# Patient Record
Sex: FEMALE | Race: WHITE | Employment: UNEMPLOYED | URBAN - METROPOLITAN AREA
[De-identification: names, ages, dates, MRNs, and addresses within clinical notes are randomized per-mention and may not be internally consistent; named-entity substitution may affect disease eponyms.]

---

## 2017-08-07 ENCOUNTER — OFFICE VISIT (OUTPATIENT)
Dept: ORTHOPEDIC SURGERY | Age: 32
End: 2017-08-07

## 2017-08-07 VITALS
WEIGHT: 122 LBS | SYSTOLIC BLOOD PRESSURE: 123 MMHG | BODY MASS INDEX: 21.62 KG/M2 | DIASTOLIC BLOOD PRESSURE: 77 MMHG | HEIGHT: 63 IN | HEART RATE: 63 BPM

## 2017-08-07 DIAGNOSIS — M79.644 FINGER PAIN, RIGHT: Primary | ICD-10-CM

## 2017-08-07 DIAGNOSIS — S69.91XA FINGERNAIL INJURY, RIGHT, INITIAL ENCOUNTER: ICD-10-CM

## 2017-08-07 PROCEDURE — 99202 OFFICE O/P NEW SF 15 MIN: CPT | Performed by: ORTHOPAEDIC SURGERY

## 2017-08-07 PROCEDURE — 73140 X-RAY EXAM OF FINGER(S): CPT | Performed by: ORTHOPAEDIC SURGERY

## 2019-08-26 ENCOUNTER — HOSPITAL ENCOUNTER (EMERGENCY)
Age: 34
Discharge: HOME | End: 2019-08-26
Payer: COMMERCIAL

## 2019-08-26 ENCOUNTER — APPOINTMENT (OUTPATIENT)
Dept: GENERAL RADIOLOGY | Age: 34
End: 2019-08-26
Payer: COMMERCIAL

## 2019-08-26 VITALS
HEIGHT: 63 IN | WEIGHT: 122 LBS | BODY MASS INDEX: 21.62 KG/M2 | OXYGEN SATURATION: 100 % | RESPIRATION RATE: 14 BRPM | DIASTOLIC BLOOD PRESSURE: 70 MMHG | HEART RATE: 99 BPM | SYSTOLIC BLOOD PRESSURE: 101 MMHG | TEMPERATURE: 98.2 F

## 2019-08-26 DIAGNOSIS — S92.425A NONDISPLACED FRACTURE OF DISTAL PHALANX OF LEFT GREAT TOE, INITIAL ENCOUNTER FOR CLOSED FRACTURE: Primary | ICD-10-CM

## 2019-08-26 DIAGNOSIS — M79.675 GREAT TOE PAIN, LEFT: ICD-10-CM

## 2019-08-26 PROCEDURE — 73630 X-RAY EXAM OF FOOT: CPT

## 2019-08-26 PROCEDURE — 99283 EMERGENCY DEPT VISIT LOW MDM: CPT

## 2019-08-26 RX ORDER — NAPROXEN 500 MG/1
500 TABLET ORAL 2 TIMES DAILY WITH MEALS
Qty: 30 TABLET | Refills: 0 | Status: SHIPPED | OUTPATIENT
Start: 2019-08-26 | End: 2020-08-09 | Stop reason: ALTCHOICE

## 2019-08-26 RX ORDER — CETIRIZINE HYDROCHLORIDE 10 MG/1
10 TABLET ORAL DAILY
COMMUNITY
End: 2020-08-09 | Stop reason: ALTCHOICE

## 2019-08-26 ASSESSMENT — PAIN SCALES - GENERAL
PAINLEVEL_OUTOF10: 7
PAINLEVEL_OUTOF10: 5

## 2019-08-26 ASSESSMENT — PAIN DESCRIPTION - PROGRESSION: CLINICAL_PROGRESSION: GRADUALLY WORSENING

## 2019-08-26 ASSESSMENT — PAIN DESCRIPTION - DESCRIPTORS: DESCRIPTORS: ACHING;THROBBING

## 2019-08-26 ASSESSMENT — ENCOUNTER SYMPTOMS
COLOR CHANGE: 0
ABDOMINAL PAIN: 0
VOMITING: 0
SHORTNESS OF BREATH: 0
BACK PAIN: 0

## 2019-08-26 ASSESSMENT — PAIN DESCRIPTION - LOCATION: LOCATION: TOE (COMMENT WHICH ONE)

## 2019-08-26 ASSESSMENT — PAIN DESCRIPTION - FREQUENCY: FREQUENCY: CONTINUOUS

## 2019-08-26 ASSESSMENT — PAIN DESCRIPTION - ONSET: ONSET: ON-GOING

## 2019-08-26 ASSESSMENT — PAIN DESCRIPTION - ORIENTATION: ORIENTATION: LEFT

## 2019-08-26 NOTE — ED PROVIDER NOTES
**[facility]  [facility]  EMERGENCY DEPARTMENT ENCOUNTER      Pt Name: Evi Casarez  QOA:[id number]  Radhagfjaime 1985  Date of evaluation: 8/26/2019  Provider: ZHANNA Guadarrama CNP      Chief Complaint:    Chief Complaint   Patient presents with    Toe Injury     states slipped down a stair and injured great toe to left foot, states \" unable to walk\"       Nursing Notes, Past Medical Hx, Past Surgical Hx, Social Hx, Allergies, and Family Hx were all reviewed and agreed with or any disagreements were addressed in the HPI.    HPI:  (Location, Duration, Timing, Severity,Quality, Assoc Sx, Context, Modifying factors)  This is a  36 y.o. female who presents today with left great toe pain. Patient states she slipped down the stairs, fell on the steps jamming her toe, injury occurred about 2 hours prior to ED arrival.  She is complaining of mostly toe pain that is worse with ambulation on the toe. She denies any head injury, no neck or back pain. She denies any lightheadedness or dizziness but before after the fall, states it is all mechanical fall. No chest pain plan chest pain or shortness of breath. No additional injuries or complaints. Only complaining of the toe pain. She rates the pain a 7 out of 10. Ambulation and movement worsens the pain. She denies any additional injuries or complaints. Denies taking medicine for pain. No additional aggravating or relieving factors. The patient presents awake, alert and in no acute respiratory distress or toxic appearance. PastMedical/Surgical History:      Diagnosis Date    Mild depression ([address]     Edis and senior year of high school     History reviewed. No pertinent surgical history. Medications:  Previous Medications    CETIRIZINE (ZYRTEC) 10 MG TABLET    Take 10 mg by mouth daily         Review of Systems:  Review of Systems   Constitutional: Negative for chills and fever.    HENT: daily.                (Please note the MDM and HPI sections of this note were completed with a voice recognition program.  Efforts weremade to edit the dictations but occasionally words are mis-transcribed.)    Electronically signed, ZHANNA Chatterjee CNP,           ZHANNA Chatterjee CNP  08/26/19 2948

## 2020-08-09 ENCOUNTER — HOSPITAL ENCOUNTER (EMERGENCY)
Age: 35
Discharge: HOME | End: 2020-08-09
Payer: COMMERCIAL

## 2020-08-09 ENCOUNTER — APPOINTMENT (OUTPATIENT)
Dept: CT IMAGING | Age: 35
End: 2020-08-09
Payer: COMMERCIAL

## 2020-08-09 VITALS
SYSTOLIC BLOOD PRESSURE: 156 MMHG | OXYGEN SATURATION: 99 % | BODY MASS INDEX: 22.14 KG/M2 | TEMPERATURE: 98 F | WEIGHT: 125 LBS | DIASTOLIC BLOOD PRESSURE: 98 MMHG | RESPIRATION RATE: 16 BRPM | HEART RATE: 74 BPM

## 2020-08-09 PROCEDURE — 99283 EMERGENCY DEPT VISIT LOW MDM: CPT

## 2020-08-09 PROCEDURE — 6370000000 HC RX 637 (ALT 250 FOR IP): Performed by: NURSE PRACTITIONER

## 2020-08-09 PROCEDURE — 70486 CT MAXILLOFACIAL W/O DYE: CPT

## 2020-08-09 RX ORDER — HYDROCODONE BITARTRATE AND ACETAMINOPHEN 5; 325 MG/1; MG/1
1-2 TABLET ORAL EVERY 6 HOURS PRN
Qty: 20 TABLET | Refills: 0 | Status: SHIPPED | OUTPATIENT
Start: 2020-08-09 | End: 2020-08-12

## 2020-08-09 RX ORDER — HYDROCODONE BITARTRATE AND ACETAMINOPHEN 5; 325 MG/1; MG/1
1 TABLET ORAL ONCE
Status: COMPLETED | OUTPATIENT
Start: 2020-08-09 | End: 2020-08-09

## 2020-08-09 RX ADMIN — HYDROCODONE BITARTRATE AND ACETAMINOPHEN 1 TABLET: 5; 325 TABLET ORAL at 19:58

## 2020-08-09 RX ADMIN — HYDROCODONE BITARTRATE AND ACETAMINOPHEN 1 TABLET: 5; 325 TABLET ORAL at 22:26

## 2020-08-09 ASSESSMENT — PAIN SCALES - GENERAL
PAINLEVEL_OUTOF10: 10
PAINLEVEL_OUTOF10: 8
PAINLEVEL_OUTOF10: 8
PAINLEVEL_OUTOF10: 10

## 2020-08-09 ASSESSMENT — PAIN DESCRIPTION - ORIENTATION: ORIENTATION: LEFT

## 2020-08-09 ASSESSMENT — PAIN DESCRIPTION - PAIN TYPE: TYPE: ACUTE PAIN

## 2020-08-09 ASSESSMENT — PAIN DESCRIPTION - LOCATION: LOCATION: EAR;FACE

## 2020-08-09 NOTE — ED PROVIDER NOTES
Evaluated by Advanced Practice Provider    [facility]  ED    CHIEF COMPLAINT  Otalgia (pt states has been having ear pain and pain on L side of face, was at urgent care and given steroids and allergy meds with no relief, states does have some bad teeth )    HISTORY OF PRESENT ILLNESS  Georgie Virgen is a 36 y.o. female who presents to the ED with complaints of left ear and facial pain. Left ear pain and pain in the left side of her face for a month. Has been to the [facility]  a couple of times. Has not seen her PCP. Has been placed on steroids, flonase, afrin, singulair, tylenol and ibuprofen without relief. The pain is a lot of pressure on the left side. Pain mostly in the ear. Touch to the left side of the face causes her pain to get worse. Denies congestion. No fevers, has some chills and sweats. Does report left sided jaw pain and left jaw pops and clicks. No report of her grinding her teeth at night. Pain is in the top of the scalp, left forehead, by the left ear and down into the jaw and neck. The patient is currently rating their pain as 8/10 and describes it as an aching type of pain. The patient denies other complaints, modifying factors or associated symptoms. The patient arrived to the ED via private car. Nursing notes reviewed. Past Medical History:   Diagnosis Date    Mild depression [facility])     Edis and senior year of high school     History reviewed. No pertinent surgical history.   Family History   Problem Relation Age of Onset    High Blood Pressure Father     Cancer Other 48        breast    Cancer Maternal Grandmother 68        breast cancer    Heart Disease Maternal Grandmother     Diabetes Paternal Grandfather     Heart Disease Paternal Grandmother         murmur     Social History     Socioeconomic History    Marital status:      Spouse name: Not on file    Number of children: Not on file    Years of education: Not on file   Ephraim Gonzalez Highest education level: Not on file   Occupational History    Not on file   Social Needs    Financial resource strain: Not on file    Food insecurity     Worry: Not on file     Inability: Not on file    Transportation needs     Medical: Not on file     Non-medical: Not on file   Tobacco Use    Smoking status: Never Smoker    Smokeless tobacco: Never Used   Substance and Sexual Activity    Alcohol use: Yes     Comment: social drinker not weekly. Birthdays and holidays    Drug use: No    Sexual activity: Yes     Partners: Male   Lifestyle    Physical activity     Days per week: Not on file     Minutes per session: Not on file    Stress: Not on file   Relationships    Social connections     Talks on phone: Not on file     Gets together: Not on file     Attends Denominational service: Not on file     Active member of club or organization: Not on file     Attends meetings of clubs or organizations: Not on file     Relationship status: Not on file    Intimate partner violence     Fear of current or ex partner: Not on file     Emotionally abused: Not on file     Physically abused: Not on file     Forced sexual activity: Not on file   Other Topics Concern    Not on file   Social History Narrative    Not on file     No current facility-administered medications for this encounter. Current Outpatient Medications   Medication Sig Dispense Refill    sertraline (ZOLOFT) 50 MG tablet Take 50 mg by mouth daily      HYDROcodone-acetaminophen (NORCO) 5-325 MG per tablet Take 1-2 tablets by mouth every 6 hours as needed for Pain for up to 3 days.  20 tablet 0     Allergies   Allergen Reactions    Sulfa Antibiotics Rash     REVIEW OF SYSTEMS    6 systems reviewed, pertinent positives per HPI otherwise noted to be negative      PHYSICAL EXAM  BP (!) 156/98   Pulse 74   Temp 98 °F (36.7 °C) (Oral)   Resp 16   Wt 125 lb (56.7 kg)   LMP 08/04/2020   SpO2 99%   BMI 22.14 kg/m²     GENERAL APPEARANCE: Awake and WITHOUT CONTRAST  8/9/2020 9:07 pm TECHNIQUE: CT of the face was performed without the administration of intravenous contrast. Multiplanar reformatted images are provided for review. Dose modulation, iterative reconstruction, and/or weight based adjustment of the mA/kV was utilized to reduce the radiation dose to as low as reasonably achievable. COMPARISON: None HISTORY: ORDERING SYSTEM PROVIDED HISTORY: left sided facial pain TECHNOLOGIST PROVIDED HISTORY: Reason for exam:->left sided facial pain Is the patient pregnant?->No Reason for Exam: left sided facial pain Acuity: Acute Type of Exam: Initial FINDINGS: FACIAL BONES:  The maxilla, pterygoid plates and zygomatic arches are intact. The mandible is intact. The mandibular condyles are normally situated. The nasal bones and maxillary nasal processes are intact. Bilateral dental caries are noted ORBITS:  The globes appear intact. The extraocular muscles, optic nerve sheath complexes and lacrimal glands appear unremarkable. No retrobulbar hematoma or mass is seen. The orbital walls and rims are intact. SINUSES/MASTOIDS:  The paranasal sinuses and mastoid air cells are well aerated. No acute fracture is seen. SOFT TISSUES:  No appreciable facial soft tissue swelling is seen. No acute traumatic injury of the facial bones. '    ED COURSE/MDM  Patient seen and evaluated. Old records reviewed. Diagnostic testing reviewed and results discussed. I have evaluated this patient. My supervising physician was available for consultation. Julien Meadows presented to the ED today with above noted complaints. Patient is afebrile and hemodynamically stable in the ED. She is well saturated on room air. Patient's physical exam is unremarkable and does not indicate any findings such as otitis media or otitis externa. I also do not see any obvious dental abscesses. CT of the facial bones obtained and without acute findings.   No evidence of sinusitis, dental abscess or other findings to explain the patient's pain. Am questioning the possibility of something such as trigeminal neuralgia or TMJ as cause to her symptoms. I advised her for this she will need to follow-up. I did not feel popping or clicking of the left TMJ and I doubt that this is likely the cause of her pain. I am giving her referral to neurology as well as I am advising her to follow-up with her PCP for further evaluation and management of her pain. Pt was given the following medications or treatments in the ED:   Medications   HYDROcodone-acetaminophen (NORCO) 5-325 MG per tablet 1 tablet (1 tablet Oral Given 8/9/20 1958)   HYDROcodone-acetaminophen (NORCO) 5-325 MG per tablet 1 tablet (1 tablet Oral Given 8/9/20 2226)       At this point I do not feel the patient requires further work up and it is reasonable to discharge the patient. Please refer to AVS for further details regarding discharge instructions. A discussion was had with the patient regarding diagnosis, diagnostic testing results, treatment/ plan of care, and follow up. All questions were answered. Patient will return to ED for new/worsening symptoms such as fever, vomiting, difficulty opening his mouth, or swallowing. Patient will follow up as directed for further evaluation/treatment. The patient was given strict return precautions as we discussed symptoms that would necessitate return to the ED. Patient will return to ED for new/worsening symptoms. The patient verbalized their understanding and agreement with the above plan. I estimate there is LOW risk for EPIGLOTTITIS, PNEUMONIA, MENINGITIS, OR URINARY TRACT INFECTION, thus I consider the discharge disposition reasonable. Also, there is no evidence or peritonitis, sepsis, or toxicity. Kenton Luis and I have discussed the diagnosis and risks, and we agree with discharging home to follow-up with their primary doctor.  We also discussed returning to the Emergency Department immediately if new or worsening symptoms occur. We have discussed the symptoms which are most concerning (e.g., changing or worsening pain, trouble swallowing or breating, neck stiffness, fever) that necessitate immediate return. Clinical Impression    1. Pressure and pain of left side of face    2. Otalgia of left ear        Discharge Vital Signs:  Blood pressure (!) 156/98, pulse 74, temperature 98 °F (36.7 °C), temperature source Oral, resp. rate 16, weight 125 lb (56.7 kg), last menstrual period 08/04/2020, SpO2 99 %, currently breastfeeding. Patient was sent home with a prescription for below medication/s. I did [place] patient on appropriate use of these medication. New Prescriptions    HYDROCODONE-ACETAMINOPHEN (NORCO) 5-325 MG PER TABLET    Take 1-2 tablets by mouth every 6 hours as needed for Pain for up to 3 days. FOLLOW UP  [address]  [place] [facility]  [phone]    Call in 1 day  For further evaluation    Shaila Taveras MD  [address] [address] [facility]  [phone]    Call today  For further evaluation    [facility] [address] [phone]  Go to   If symptoms worsen      DISPOSITION  Patient was discharged to home in good condition. Comment: Please note this report has been produced using speech recognition software and may contain errors related to that system including errors in grammar, punctuation, and spelling, as well as words and phrases that may be inappropriate. If there are any questions or concerns please feel free to contact the dictating provider for clarification.             ZHANNA Felix - CHRISTI  08/09/20 7856

## 2020-08-10 ENCOUNTER — CARE COORDINATION (OUTPATIENT)
Dept: CARE COORDINATION | Age: 35
End: 2020-08-10

## 2020-08-10 NOTE — CARE COORDINATION
Patient contacted regarding recent visit for viral symptoms. This Marta Mendez contacted the patient by telephone to perform post discharge call. Left message on machine for a return call regarding ED visit 8/9. If no return call, will attempt to reach pt again.     Mariaelena Liu  [phone]

## 2020-08-10 NOTE — ED NOTES
Discharge instructions were reviewed with the patient and the patient verbalized understanding. Patient stable and ambulatory upon discharge to home with .             Mary Lou Broussard RN  08/09/20 0982

## 2020-08-11 NOTE — CARE COORDINATION
Patient contacted regarding recent visit for viral symptoms. This Flakita Carpenter contacted the patient by telephone to perform post discharge call. Left another message on machine for a return call regarding ED visit 8/9. Will wait for a return call.      Zacarias Pérez  [phone]

## 2021-12-31 ENCOUNTER — HOSPITAL ENCOUNTER (EMERGENCY)
Age: 36
Discharge: HOME | End: 2021-12-31
Payer: COMMERCIAL

## 2021-12-31 VITALS
WEIGHT: 124 LBS | DIASTOLIC BLOOD PRESSURE: 96 MMHG | HEIGHT: 63 IN | SYSTOLIC BLOOD PRESSURE: 130 MMHG | TEMPERATURE: 99.1 F | HEART RATE: 60 BPM | RESPIRATION RATE: 14 BRPM | BODY MASS INDEX: 21.97 KG/M2 | OXYGEN SATURATION: 98 %

## 2021-12-31 DIAGNOSIS — K04.7 DENTAL INFECTION: ICD-10-CM

## 2021-12-31 DIAGNOSIS — K08.89 PAIN, DENTAL: Primary | ICD-10-CM

## 2021-12-31 PROCEDURE — 99283 EMERGENCY DEPT VISIT LOW MDM: CPT

## 2021-12-31 PROCEDURE — 64400 NJX AA&/STRD TRIGEMINAL NRV: CPT

## 2021-12-31 RX ORDER — PENICILLIN V POTASSIUM 500 MG/1
500 TABLET ORAL 4 TIMES DAILY
Qty: 40 TABLET | Refills: 0 | Status: SHIPPED | OUTPATIENT
Start: 2021-12-31 | End: 2022-01-10

## 2021-12-31 ASSESSMENT — PAIN DESCRIPTION - LOCATION: LOCATION: JAW

## 2021-12-31 ASSESSMENT — PAIN SCALES - GENERAL: PAINLEVEL_OUTOF10: 8

## 2021-12-31 NOTE — Clinical Note
Frank Gonzalez was seen and treated in our emergency department on 12/31/2021. She may return to work on 01/03/2022. If you have any questions or concerns, please don't hesitate to call.       Milvia Neal PA-C

## 2021-12-31 NOTE — ED PROVIDER NOTES
**ADVANCED PRACTICE PROVIDER, I HAVE EVALUATED THIS [facility]  ED  EMERGENCY DEPARTMENT ENCOUNTER      Pt Name: Jael Bradford  [facility]:[id number]  Reg 1985  Date of evaluation: 2021  Provider: Maru Rojo PA-C      Chief Complaint:    Chief Complaint   Patient presents with    Dental Pain     Hx of trigeminal neuralgia. left sided dental pain, near TMJ, and reports she cannot close her mouth. Pain occured after eating dinner last night. Nursing Notes, Past Medical Hx, Past Surgical Hx, Social Hx, Allergies, and Family Hx were all reviewed and agreed with or any disagreements were addressed in the HPI.    HPI: (Location, Duration, Timing, Severity, Quality, Assoc Sx, Context, Modifying factors)    Chief Complaint of tooth pain    This is a  29 y.o. female who presents to the emergency department, the patient states that last night she developed some left posterior most tooth pain. She states that she has history of trigeminal neuralgia, and this is increasing her pain. She rates her pain level as 8/10. She states that her left jaw is sore, and that her teeth normally do not line up. This has been going on for the last day. PastMedical/Surgical History:      Diagnosis Date    Anxiety     Mild depression ([facility] [address]     Edis and senior year of high school    Trigeminal neuralgia          Procedure Laterality Date     SECTION         Medications:  Discharge Medication List as of 2021  4:48 PM      CONTINUE these medications which have NOT CHANGED    Details   sertraline (ZOLOFT) 50 MG tablet Take 50 mg by mouth dailyHistorical Med               Review of Systems:  (2-9 systems needed)  Review of Systems    \"Positives and Pertinent negatives as per HPI\"    Physical Exam:  Physical Exam  Vitals and nursing note reviewed. Constitutional:       Appearance: Normal appearance. She is well-developed. She is not ill-appearing or diaphoretic. HENT:      Head: Normocephalic and atraumatic. Jaw: No trismus. Right Ear: External ear normal.      Left Ear: External ear normal.      Nose: Nose normal.      Mouth/Throat:      Mouth: No lacerations or oral lesions. Dentition: Abnormal dentition. Dental tenderness and dental caries present. Pharynx: Uvula midline. No oropharyngeal exudate, posterior oropharyngeal erythema or uvula swelling. Tonsils: No tonsillar abscesses. Eyes:      General:         Right eye: No discharge. Left eye: No discharge. Cardiovascular:      Rate and Rhythm: Normal rate and regular rhythm. Heart sounds: Normal heart sounds. No murmur heard. No friction rub. No gallop. Pulmonary:      Effort: Pulmonary effort is normal. No respiratory distress. Breath sounds: Normal breath sounds. No stridor. No wheezing or rales. Chest:      Chest wall: No tenderness. Musculoskeletal:         General: Normal range of motion. Cervical back: Normal range of motion and neck supple. Skin:     General: Skin is warm and dry. Coloration: Skin is not pale. Neurological:      General: No focal deficit present. Mental Status: She is alert and oriented to person, place, and time. Psychiatric:         Mood and Affect: Mood normal.         Behavior: Behavior normal.         MEDICAL DECISION MAKING    Vitals:    Vitals:    12/31/21 1622   BP: (!) 130/96   Pulse: 60   Resp: 14   Temp: 99.1 °F (37.3 °C)   TempSrc: Oral   SpO2: 98%   Weight: 124 lb (56.2 kg)   Height: 5' 3\" (1.6 m)       LABS:Labs Reviewed - No data to display     Remainder of labs reviewed and were negative at this time or not returned at the time of this note.     RADIOLOGY:   Non-plain film images such as CT, Ultrasound and MRI are read by the radiologist. Jorge Magallanes PA-C have directly visualized the radiologic plain film image(s) with the below findings:      Interpretation per the Radiologist below, if available at the time of this note:    No orders to display        No results found. MEDICAL DECISION MAKING / ED COURSE:      PROCEDURES:  DENTAL INJECTION / NERVE BLOCK-INFERIOR ALVEOLAR--  0.5% BUPIVICAINE WITH EPINEPHRINE WAS USED TO BLOCK BOTH THE INFERIOR ALVEOLAR NERVE AND THE BUCCAL MUCOSA ADJACENT TO THE TOOTH. I IDENTIFIED THE LOWER POSTERIOR MANDIBLE JUST BELOW THE ANTERIOR BORDER OF THE RAMUS AND INSERTED THE 27 GUAGE NEEDLE 1 CM POSTERIORLY, ASPIRATED AND INJECTED 3-4 CC. NEXT A SIMILAR PROCEDURE WAS DONE ADJACENT TO THE OFFENDING TOOTH. PATIENT TOLERATED THE PROCEDURE WELL AND HAD ADEQUATE RELIEF OF PAIN        Patient was given:  Medications - No data to display    Nothing to suggest overwhelming cellulitis or infection. The patient tolerated their visit well. I evaluated the patient. The physician was available for consultation as needed. The patient and / or the family were informed of the results of any tests, a time was given to answer questions, a plan was proposed and they agreed with plan. CLINICAL IMPRESSION:  1. Pain, dental    2.  Dental infection        DISPOSITION Decision To Discharge 12/31/2021 04:44:53 PM      PATIENT REFERRED TO:  see your dentist ASAP    Call in 2 days  For a recheck in 2-7 days      DISCHARGE MEDICATIONS:  Discharge Medication List as of 12/31/2021  4:48 PM      START taking these medications    Details   penicillin v potassium (VEETID) 500 MG tablet Take 1 tablet by mouth 4 times daily for 10 days, Disp-40 tablet, R-0Print             DISCONTINUED MEDICATIONS:  Discharge Medication List as of 12/31/2021  4:48 PM                 (Please note the MDM and HPI sections of this note were completed with a voice recognition program.  Efforts were made to edit the dictations but occasionally words are mis-transcribed.)    Electronically signed, Yohannes Traore PA-C,          Yohannes Traore PA-C  12/31/21 [id number]

## 2022-03-29 RX ORDER — CALCIUM CARBONATE 300MG(750)
TABLET,CHEWABLE ORAL DAILY
Status: ON HOLD | COMMUNITY
End: 2022-04-13 | Stop reason: HOSPADM

## 2022-03-29 RX ORDER — CARBAMAZEPINE 400 MG/1
400 TABLET, EXTENDED RELEASE ORAL 2 TIMES DAILY
Status: ON HOLD | COMMUNITY
Start: 2021-04-07 | End: 2022-04-13 | Stop reason: HOSPADM

## 2022-03-29 RX ORDER — ACETAMINOPHEN, ASPIRIN AND CAFFEINE 250; 250; 65 MG/1; MG/1; MG/1
TABLET, FILM COATED ORAL
Status: ON HOLD | COMMUNITY
End: 2022-04-13 | Stop reason: HOSPADM

## 2022-03-29 RX ORDER — CARBAMAZEPINE 100 MG/1
200 TABLET, EXTENDED RELEASE ORAL 2 TIMES DAILY
COMMUNITY
Start: 2021-04-07

## 2022-03-29 RX ORDER — DIPHENOXYLATE HYDROCHLORIDE AND ATROPINE SULFATE 2.5; .025 MG/1; MG/1
TABLET ORAL
Status: ON HOLD | COMMUNITY
End: 2022-04-13 | Stop reason: HOSPADM

## 2022-03-29 NOTE — PROGRESS NOTES
Surgery was originally scheduled in January. No new orders received with new scheduling sheet. Will use orders from [place]. [facility] for Arely Chou, at surgeon office. Notified we will use orders faxed in January. Notified to call back and fax new orders if anything new wanted. Lorraine Ellsworth

## 2022-03-29 NOTE — PROGRESS NOTES
[facility] PRE-SURGICAL TESTING INSTRUCTIONS                                  PRIOR TO PROCEDURE DATE:        1. PLEASE FOLLOW ANY  GUIDELINES/ INSTRUCTIONS PRIOR TO YOUR PROCEDURE AS ADVISED BY YOUR SURGEON. 2. Arrange for someone to drive you home and be with you for the first 24 hours after discharge for your safety after your procedure for which you received sedation. Ensure it is someone we can share information with regarding your discharge. 3. You must contact your surgeon for instructions IF:   You are taking any blood thinners, aspirin, anti-inflammatory or vitamin E.   There is a change in your physical condition such as a cold, fever, rash, cuts, sores or any other infection, especially near your surgical site. 4. Do not drink alcohol the day before or day of your procedure. 5. A Pre-op History and Physical for surgery MUST be completed by your Physician or Urgent Care within 30 days of your procedure date. Please bring a copy with you on the day of your procedure and along with any other testing performed. THE DAY OF YOUR PROCEDURE:  1. Follow instructions for ARRIVAL TIME as DIRECTED BY YOUR SURGEON. 2. Enter the MAIN entrance from [employer] and follow the signs to the free [employer] or [employer] parking (offered free of charge 6am-5pm). 3. Enter the Main Entrance of the hospital (do not enter from the lower level of the parking garage). Upon entrance, check in with the  at the main desk on your left. If no one is available at the desk, proceed into the [facility] Waiting Room and go through the door directly into the [facility]. There is a Check-in desk ACROSS from Room 5 (marked with a sign hanging from the ceiling). The phone number for the surgery center is [phone]. 4. Please call [phone] option #2 option #2 if you have not been preregistered yet.   On the day of your procedure bring your insurance card and photo ID. You will be registered at your bedside once brought back to your room. 5. DO NOT EAT ANYTHING eight hours prior to your arrival for surgery. May have 8 ounces of water 4 hours prior to your arrival for surgery. NOTE: ALL Gastric, Bariatric and Bowel surgery patients MUST follow their surgeon's instructions. 6. MEDICATIONS    Take the following medications with a SMALL sip of water: TEGRETOL    Bariatric patient's call surgeon if on diabetic medications as some need to be stopped 1 week preop   Use your usual dose of inhalers the morning of surgery. BRING your rescue inhaler with you to hospital.    Anesthesia does NOT want you to take insulin the morning of surgery. They will control your blood sugar while you are at the hospital. Please contact your ordering physician for instructions regarding your insulin the night before your procedure. If you have an insulin pump, please keep it set on basal rate. 7. Do not swallow water when brushing teeth. No gum, candy, mints or ice chips. Refrain from smoking or at least decrease the amount. 8. Dress in loose, comfortable clothing appropriate for redressing after your procedure. Do not wear jewelry (including body piercings), make-up (especially NO eye make-up), fingernail polish (NO toenail polish if foot/leg surgery), lotion, powders or metal hairclips. 9. Dentures, glasses, or contacts will need to be removed before your procedure. Bring cases for your glasses, contacts, dentures, or hearing aids to protect them while you are in surgery. 10. If you use a CPAP, please bring it with you on the day of your procedure. 11. We recommend that valuable personal  belongings such as cash, cell phones, e-tablets or jewelry, be left at home during your stay. The hospital will not be responsible for valuables that are not secured in the hospital safe.  However, if your insurance requires a co-pay, you may want to bring a method of payment, i.e. Check or credit card, if you wish to pay your co-pay the day of surgery. 12. If you are to stay overnight, you may bring a bag with personal items. Please have any large items you may need brought in by your family after your arrival to your hospital room. 15. If you have a Living Will or Durable Power of , please bring a copy on the day of your procedure. 15. With your permission, one family member may accompany you while you are being prepared for surgery. Once you are ready, additional family members may join you. HOW WE KEEP YOU SAFE and WORK TO PREVENT SURGICAL SITE INFECTIONS:  1. Health care workers should always check your ID bracelet to verify your name and birth date. You will be asked many times to state your name, date of birth, and allergies. 2. Health care workers should always clean their hands with soap or alcohol gel before providing care to you. It is okay to ask anyone if they cleaned their hands before they touch you. 3. You will be actively involved in verifying the type of procedure you are having and ensuring the correct surgical site. This will be confirmed multiple times prior to your procedure. Do NOT mariia your surgery site UNLESS instructed to by your surgeon. 4. Do not shave or wax for 72 hours prior to procedure near your operative site. Shaving with a razor can irritate your skin and make it easier to develop an infection. On the day of your procedure, any hair that needs to be removed near the surgical site will be clipped by a healthcare worker using a special clippers designed to avoid skin irritation. 5. When you are in the operating room, your surgical site will be cleansed with a special soap, and in most cases, you will be given an antibiotic before the surgery begins. What to expect AFTER YOUR PROCEDURE:  1. Immediately following your procedure, your will be taken to the PACU for the first phase of your recovery.   Your nurse will help you recover from any potential side effects of anesthesia, such as extreme drowsiness, changes in your vital signs or breathing patterns. Nausea, headache, muscle aches, or sore throat may also occur after anesthesia. Your nurse will help you manage these potential side effects. 2. For comfort and safety, arrange to have someone at home with you for the first 24 hours after discharge. 3. You and your family will be given written instructions about your diet, activity, dressing care, medications, and return visits. 4. Once at home, should issues with nausea, pain, or bleeding occur, or should you notice any signs of infection, you should call your surgeon. 5. Always clean your hands before and after caring for your wound. Do not let your family touch your surgery site without cleaning their hands. 6. Narcotic pain medications can cause significant constipation. You may want to add a stool softener to your postoperative medication schedule or speak to your surgeon on how best to manage this SIDE EFFECT. SPECIAL INSTRUCTIONS     Thank you for allowing us to care for you. We strive to exceed your expectations in the delivery of care and service provided to you and your family. If you need to contact the [place] [address] staff for any reason, please call us at [phone]    Instructions reviewed with patient during preadmission testing phone interview.   Sha Boyd RN.3/29/2022 .1:13 PM      ADDITIONAL EDUCATIONAL INFORMATION REVIEWED PER PHONE WITH YOU AND/OR YOUR FAMILY:  Yes Hibiclens® Bathing Instructions - PER SURGEON INSTRUCTIONS

## 2022-03-29 NOTE — PROGRESS NOTES
Place patient label inside box (if no patient label, complete below)  Name:  :  MR#:   Evelyn Angles / PROCEDURE  1. I (we), Judi Acuña  (Patient Name) authorize DR. Nancy Contreras (Provider / Claudio Albright) and/or such assistants as may be selected by him/her, to perform the following operation/procedure(s): LEFT MICROVASCULAR DECOMPRESSION       Note: If unable to obtain consent prior to an emergent procedure, document the emergent reason in the medical record. This procedure has been explained to my (our) satisfaction and included in the explanation was:  A) The intended benefit, nature, and extent of the procedure to be performed;  B) The significant risks involved and the probability of success;  C) Alternative procedures and methods of treatment;  D) The dangers and probable consequences of such alternatives (including no procedure or treatment); E) The expected consequences of the procedure on my future health;  F) Whether other qualified individuals would be performing important surgical tasks and/or whether  would be present to advise or support the procedure. I (we) understand that there are other risks of infection and other serious complications in the pre-operative/procedural and postoperative/procedural stages of my (our) care. I (we) have asked all of the questions which I (we) thought were important in deciding whether or not to undergo treatment or diagnosis. These questions have been answered to my (our) satisfaction. I (we) understand that no assurance can be given that the procedure will be a success, and no guarantee or warranty of success has been given to me (us). 2. It has been explained to me (us) that during the course of the operation/procedure, unforeseen conditions may be revealed that necessitate extension of the original procedure(s) or different procedure(s) than those set forth in Paragraph 1. I (we) authorize and request that the above-named physician, his/her assistants or his/her designees, perform procedures as necessary and desirable if deemed to be in my (our) best interest.     Revised 8/2/2021                                                                          Page 1 of 2       3. I acknowledge that health care personnel may be observing this procedure for the purpose of medical education or other specified purposes as may be necessary as requested and/or approved by my (our) physician. 4. I (we) consent to the disposal by the hospital Pathologist of the removed tissue, parts or organs in accordance with hospital policy. 5. I do ____ do not ____ consent to the use of a local infiltration pain blocking agent that will be used by my provider/surgical provider to help alleviate pain during my procedure. 6. I do ____ do not ____ consent to an emergent blood transfusion in the case of a life-threatening situation that requires blood components to be administered. This consent is valid for 24 hours from the beginning of the procedure. 7. This patient does ____ or does not ____ currently have a DNR status/order. If DNR order is in place, obtain Addendum to the Surgical Consent for ALL Patients with a DNR Order to address yana-operative status for limited intervention or DNR suspension.      8. I have read and fully understand the above Consent for Operation/Procedure and that all blanks were completed before I signed the consent.   _____________________________       _____________________      ____/____am/pm  Signature of Patient or legal representative      Printed Name / Relationship            Date / Time   ____________________________       _____________________      ____/____am/pm  Witness to Signature                                    Printed Name                    Date / Time     If patient is unable to sign or is a minor, complete the following)  Patient is a minor, ____ years of age, or unable to sign because:   ______________________________________________________________________________________________    [facility] If a phone consent is obtained, consent will be documented by using two health care professionals, each affirming that the consenting party has no questions and gives consent for the procedure discussed with the physician/provider.   _____________________          ____________________       _____/_____am/pm   2nd witness to phone consent        Printed name           Date / Time    Informed Consent:  I have provided the explanation described above in section 1 to the patient and/or legal representative.  I have provided the patient and/or legal representative with an opportunity to ask any questions about the proposed operation/procedure.   ___________________________          ____________________         ____/____am/pm  Provider / Proceduralist                            Printed name            Date / Time  Revised 8/2/2021                                                                      Page 2 of 2

## 2022-04-08 ENCOUNTER — ANESTHESIA EVENT (OUTPATIENT)
Dept: OPERATING ROOM | Age: 37
DRG: 027 | End: 2022-04-08
Payer: COMMERCIAL

## 2022-04-08 NOTE — PROGRESS NOTES
Called PCP office for pre op testing. Will send labs when resulted or check Care Everywhere.  Beni Arteaga

## 2022-04-11 ENCOUNTER — APPOINTMENT (OUTPATIENT)
Dept: CT IMAGING | Age: 37
DRG: 027 | End: 2022-04-11
Attending: NEUROLOGICAL SURGERY
Payer: COMMERCIAL

## 2022-04-11 ENCOUNTER — HOSPITAL ENCOUNTER (INPATIENT)
Age: 37
LOS: 2 days | Discharge: HOME | DRG: 027 | End: 2022-04-13
Attending: NEUROLOGICAL SURGERY | Admitting: NEUROLOGICAL SURGERY
Payer: COMMERCIAL

## 2022-04-11 ENCOUNTER — ANESTHESIA (OUTPATIENT)
Dept: OPERATING ROOM | Age: 37
DRG: 027 | End: 2022-04-11
Payer: COMMERCIAL

## 2022-04-11 VITALS — OXYGEN SATURATION: 100 % | SYSTOLIC BLOOD PRESSURE: 131 MMHG | DIASTOLIC BLOOD PRESSURE: 75 MMHG

## 2022-04-11 DIAGNOSIS — Z98.890 S/P CRANIOTOMY: Primary | ICD-10-CM

## 2022-04-11 PROBLEM — G50.0 TRIGEMINAL NEURALGIA: Status: ACTIVE | Noted: 2022-04-11

## 2022-04-11 LAB
ABO/RH: NORMAL
ANTIBODY SCREEN: NORMAL
PREGNANCY, URINE: NEGATIVE

## 2022-04-11 PROCEDURE — 84703 CHORIONIC GONADOTROPIN ASSAY: CPT

## 2022-04-11 PROCEDURE — 86850 RBC ANTIBODY SCREEN: CPT

## 2022-04-11 PROCEDURE — 6370000000 HC RX 637 (ALT 250 FOR IP): Performed by: NEUROLOGICAL SURGERY

## 2022-04-11 PROCEDURE — 86900 BLOOD TYPING SEROLOGIC ABO: CPT

## 2022-04-11 PROCEDURE — 86901 BLOOD TYPING SEROLOGIC RH(D): CPT

## 2022-04-11 PROCEDURE — 6370000000 HC RX 637 (ALT 250 FOR IP): Performed by: PHYSICIAN ASSISTANT

## 2022-04-11 PROCEDURE — 6360000002 HC RX W HCPCS: Performed by: PHYSICIAN ASSISTANT

## 2022-04-11 PROCEDURE — 2500000003 HC RX 250 WO HCPCS: Performed by: NURSE ANESTHETIST, CERTIFIED REGISTERED

## 2022-04-11 PROCEDURE — 2580000003 HC RX 258: Performed by: NEUROLOGICAL SURGERY

## 2022-04-11 PROCEDURE — 00NK0ZZ RELEASE TRIGEMINAL NERVE, OPEN APPROACH: ICD-10-PCS | Performed by: NEUROLOGICAL SURGERY

## 2022-04-11 PROCEDURE — 3600000004 HC SURGERY LEVEL 4 BASE: Performed by: NEUROLOGICAL SURGERY

## 2022-04-11 PROCEDURE — 3700000000 HC ANESTHESIA ATTENDED CARE: Performed by: NEUROLOGICAL SURGERY

## 2022-04-11 PROCEDURE — 3600000014 HC SURGERY LEVEL 4 ADDTL 15MIN: Performed by: NEUROLOGICAL SURGERY

## 2022-04-11 PROCEDURE — 2580000003 HC RX 258: Performed by: ANESTHESIOLOGY

## 2022-04-11 PROCEDURE — C1781 MESH (IMPLANTABLE): HCPCS | Performed by: NEUROLOGICAL SURGERY

## 2022-04-11 PROCEDURE — 3700000001 HC ADD 15 MINUTES (ANESTHESIA): Performed by: NEUROLOGICAL SURGERY

## 2022-04-11 PROCEDURE — 6360000002 HC RX W HCPCS: Performed by: ANESTHESIOLOGY

## 2022-04-11 PROCEDURE — 6360000002 HC RX W HCPCS: Performed by: NEUROLOGICAL SURGERY

## 2022-04-11 PROCEDURE — 7100000001 HC PACU RECOVERY - ADDTL 15 MIN: Performed by: NEUROLOGICAL SURGERY

## 2022-04-11 PROCEDURE — 2580000003 HC RX 258: Performed by: PHYSICIAN ASSISTANT

## 2022-04-11 PROCEDURE — 2000000000 HC ICU R&B

## 2022-04-11 PROCEDURE — 6370000000 HC RX 637 (ALT 250 FOR IP): Performed by: ANESTHESIOLOGY

## 2022-04-11 PROCEDURE — C1713 ANCHOR/SCREW BN/BN,TIS/BN: HCPCS | Performed by: NEUROLOGICAL SURGERY

## 2022-04-11 PROCEDURE — 2720000010 HC SURG SUPPLY STERILE: Performed by: NEUROLOGICAL SURGERY

## 2022-04-11 PROCEDURE — 2709999900 HC NON-CHARGEABLE SUPPLY: Performed by: NEUROLOGICAL SURGERY

## 2022-04-11 PROCEDURE — 7100000000 HC PACU RECOVERY - FIRST 15 MIN: Performed by: NEUROLOGICAL SURGERY

## 2022-04-11 PROCEDURE — 6360000002 HC RX W HCPCS: Performed by: NURSE ANESTHETIST, CERTIFIED REGISTERED

## 2022-04-11 PROCEDURE — 70450 CT HEAD/BRAIN W/O DYE: CPT

## 2022-04-11 PROCEDURE — 2580000003 HC RX 258: Performed by: NURSE ANESTHETIST, CERTIFIED REGISTERED

## 2022-04-11 PROCEDURE — A4217 STERILE WATER/SALINE, 500 ML: HCPCS | Performed by: NEUROLOGICAL SURGERY

## 2022-04-11 DEVICE — GRAFT HUM TISS 10MM VEGA ALLO-SPAN: Type: IMPLANTABLE DEVICE | Status: FUNCTIONAL

## 2022-04-11 DEVICE — SCREW UN3 SLFTP 1.5X4MM: Type: IMPLANTABLE DEVICE | Status: FUNCTIONAL

## 2022-04-11 RX ORDER — SODIUM CHLORIDE 9 MG/ML
25 INJECTION, SOLUTION INTRAVENOUS PRN
Status: DISCONTINUED | OUTPATIENT
Start: 2022-04-11 | End: 2022-04-13 | Stop reason: HOSPADM

## 2022-04-11 RX ORDER — CARBAMAZEPINE 200 MG/1
200 TABLET, EXTENDED RELEASE ORAL 2 TIMES DAILY
Status: DISCONTINUED | OUTPATIENT
Start: 2022-04-11 | End: 2022-04-11 | Stop reason: SDUPTHER

## 2022-04-11 RX ORDER — SODIUM CHLORIDE 0.9 % (FLUSH) 0.9 %
5-40 SYRINGE (ML) INJECTION PRN
Status: DISCONTINUED | OUTPATIENT
Start: 2022-04-11 | End: 2022-04-13 | Stop reason: HOSPADM

## 2022-04-11 RX ORDER — SODIUM CHLORIDE 0.9 % (FLUSH) 0.9 %
5-40 SYRINGE (ML) INJECTION EVERY 12 HOURS SCHEDULED
Status: DISCONTINUED | OUTPATIENT
Start: 2022-04-11 | End: 2022-04-11 | Stop reason: HOSPADM

## 2022-04-11 RX ORDER — CARBAMAZEPINE 200 MG/1
600 TABLET, EXTENDED RELEASE ORAL 2 TIMES DAILY
Status: DISCONTINUED | OUTPATIENT
Start: 2022-04-11 | End: 2022-04-13 | Stop reason: HOSPADM

## 2022-04-11 RX ORDER — DEXAMETHASONE 4 MG/1
4 TABLET ORAL EVERY 6 HOURS
Status: DISCONTINUED | OUTPATIENT
Start: 2022-04-11 | End: 2022-04-12

## 2022-04-11 RX ORDER — SODIUM CHLORIDE 9 MG/ML
INJECTION, SOLUTION INTRAVENOUS CONTINUOUS
Status: ACTIVE | OUTPATIENT
Start: 2022-04-11 | End: 2022-04-12

## 2022-04-11 RX ORDER — PROPOFOL 10 MG/ML
INJECTION, EMULSION INTRAVENOUS CONTINUOUS PRN
Status: DISCONTINUED | OUTPATIENT
Start: 2022-04-11 | End: 2022-04-11 | Stop reason: SDUPTHER

## 2022-04-11 RX ORDER — ONDANSETRON 2 MG/ML
4 INJECTION INTRAMUSCULAR; INTRAVENOUS
Status: COMPLETED | OUTPATIENT
Start: 2022-04-11 | End: 2022-04-11

## 2022-04-11 RX ORDER — SODIUM CHLORIDE 0.9 % (FLUSH) 0.9 %
5-40 SYRINGE (ML) INJECTION EVERY 12 HOURS SCHEDULED
Status: DISCONTINUED | OUTPATIENT
Start: 2022-04-11 | End: 2022-04-13 | Stop reason: HOSPADM

## 2022-04-11 RX ORDER — ESMOLOL HYDROCHLORIDE 10 MG/ML
INJECTION INTRAVENOUS PRN
Status: DISCONTINUED | OUTPATIENT
Start: 2022-04-11 | End: 2022-04-11 | Stop reason: SDUPTHER

## 2022-04-11 RX ORDER — HYDROMORPHONE HCL 110MG/55ML
PATIENT CONTROLLED ANALGESIA SYRINGE INTRAVENOUS PRN
Status: DISCONTINUED | OUTPATIENT
Start: 2022-04-11 | End: 2022-04-11 | Stop reason: SDUPTHER

## 2022-04-11 RX ORDER — OXYCODONE HYDROCHLORIDE 5 MG/1
5 TABLET ORAL EVERY 4 HOURS PRN
Status: DISCONTINUED | OUTPATIENT
Start: 2022-04-11 | End: 2022-04-13 | Stop reason: HOSPADM

## 2022-04-11 RX ORDER — SODIUM CHLORIDE 9 MG/ML
INJECTION, SOLUTION INTRAVENOUS PRN
Status: DISCONTINUED | OUTPATIENT
Start: 2022-04-11 | End: 2022-04-11 | Stop reason: HOSPADM

## 2022-04-11 RX ORDER — CARBAMAZEPINE 200 MG/1
400 TABLET, EXTENDED RELEASE ORAL 2 TIMES DAILY
Status: DISCONTINUED | OUTPATIENT
Start: 2022-04-11 | End: 2022-04-11 | Stop reason: SDUPTHER

## 2022-04-11 RX ORDER — LABETALOL HYDROCHLORIDE 5 MG/ML
10 INJECTION, SOLUTION INTRAVENOUS
Status: DISCONTINUED | OUTPATIENT
Start: 2022-04-11 | End: 2022-04-11 | Stop reason: HOSPADM

## 2022-04-11 RX ORDER — SODIUM CHLORIDE, SODIUM LACTATE, POTASSIUM CHLORIDE, CALCIUM CHLORIDE 600; 310; 30; 20 MG/100ML; MG/100ML; MG/100ML; MG/100ML
INJECTION, SOLUTION INTRAVENOUS CONTINUOUS
Status: DISCONTINUED | OUTPATIENT
Start: 2022-04-11 | End: 2022-04-11

## 2022-04-11 RX ORDER — MANNITOL 20 G/100ML
INJECTION, SOLUTION INTRAVENOUS PRN
Status: DISCONTINUED | OUTPATIENT
Start: 2022-04-11 | End: 2022-04-11 | Stop reason: SDUPTHER

## 2022-04-11 RX ORDER — ONDANSETRON 2 MG/ML
INJECTION INTRAMUSCULAR; INTRAVENOUS PRN
Status: DISCONTINUED | OUTPATIENT
Start: 2022-04-11 | End: 2022-04-11 | Stop reason: SDUPTHER

## 2022-04-11 RX ORDER — DEXAMETHASONE SODIUM PHOSPHATE 4 MG/ML
INJECTION, SOLUTION INTRA-ARTICULAR; INTRALESIONAL; INTRAMUSCULAR; INTRAVENOUS; SOFT TISSUE PRN
Status: DISCONTINUED | OUTPATIENT
Start: 2022-04-11 | End: 2022-04-11 | Stop reason: SDUPTHER

## 2022-04-11 RX ORDER — SODIUM CHLORIDE 0.9 % (FLUSH) 0.9 %
5-40 SYRINGE (ML) INJECTION PRN
Status: DISCONTINUED | OUTPATIENT
Start: 2022-04-11 | End: 2022-04-11 | Stop reason: HOSPADM

## 2022-04-11 RX ORDER — REMIFENTANIL HYDROCHLORIDE 1 MG/ML
INJECTION, POWDER, LYOPHILIZED, FOR SOLUTION INTRAVENOUS CONTINUOUS PRN
Status: DISCONTINUED | OUTPATIENT
Start: 2022-04-11 | End: 2022-04-11 | Stop reason: SDUPTHER

## 2022-04-11 RX ORDER — SODIUM CHLORIDE, SODIUM LACTATE, POTASSIUM CHLORIDE, CALCIUM CHLORIDE 600; 310; 30; 20 MG/100ML; MG/100ML; MG/100ML; MG/100ML
INJECTION, SOLUTION INTRAVENOUS CONTINUOUS PRN
Status: DISCONTINUED | OUTPATIENT
Start: 2022-04-11 | End: 2022-04-11 | Stop reason: SDUPTHER

## 2022-04-11 RX ORDER — ONDANSETRON 4 MG/1
4 TABLET, ORALLY DISINTEGRATING ORAL EVERY 8 HOURS PRN
Status: DISCONTINUED | OUTPATIENT
Start: 2022-04-11 | End: 2022-04-12

## 2022-04-11 RX ORDER — FENTANYL CITRATE 50 UG/ML
INJECTION, SOLUTION INTRAMUSCULAR; INTRAVENOUS PRN
Status: DISCONTINUED | OUTPATIENT
Start: 2022-04-11 | End: 2022-04-11 | Stop reason: SDUPTHER

## 2022-04-11 RX ORDER — HYDRALAZINE HYDROCHLORIDE 20 MG/ML
10 INJECTION INTRAMUSCULAR; INTRAVENOUS
Status: DISCONTINUED | OUTPATIENT
Start: 2022-04-11 | End: 2022-04-11 | Stop reason: HOSPADM

## 2022-04-11 RX ORDER — DIAZEPAM 5 MG/1
5 TABLET ORAL EVERY 6 HOURS PRN
Status: DISCONTINUED | OUTPATIENT
Start: 2022-04-11 | End: 2022-04-13 | Stop reason: HOSPADM

## 2022-04-11 RX ORDER — SUCCINYLCHOLINE/SOD CL,ISO/PF 200MG/10ML
SYRINGE (ML) INTRAVENOUS PRN
Status: DISCONTINUED | OUTPATIENT
Start: 2022-04-11 | End: 2022-04-11 | Stop reason: SDUPTHER

## 2022-04-11 RX ORDER — ONDANSETRON 2 MG/ML
4 INJECTION INTRAMUSCULAR; INTRAVENOUS EVERY 6 HOURS PRN
Status: DISCONTINUED | OUTPATIENT
Start: 2022-04-11 | End: 2022-04-12

## 2022-04-11 RX ORDER — EPHEDRINE SULFATE 50 MG/ML
INJECTION INTRAVENOUS PRN
Status: DISCONTINUED | OUTPATIENT
Start: 2022-04-11 | End: 2022-04-11 | Stop reason: SDUPTHER

## 2022-04-11 RX ORDER — HEPARIN SODIUM 5000 [USP'U]/ML
5000 INJECTION, SOLUTION INTRAVENOUS; SUBCUTANEOUS EVERY 8 HOURS SCHEDULED
Status: DISCONTINUED | OUTPATIENT
Start: 2022-04-12 | End: 2022-04-13 | Stop reason: HOSPADM

## 2022-04-11 RX ORDER — ACETAMINOPHEN 325 MG/1
650 TABLET ORAL
Status: COMPLETED | OUTPATIENT
Start: 2022-04-11 | End: 2022-04-11

## 2022-04-11 RX ORDER — SODIUM CHLORIDE, SODIUM LACTATE, POTASSIUM CHLORIDE, AND CALCIUM CHLORIDE .6; .31; .03; .02 G/100ML; G/100ML; G/100ML; G/100ML
IRRIGANT IRRIGATION PRN
Status: DISCONTINUED | OUTPATIENT
Start: 2022-04-11 | End: 2022-04-11 | Stop reason: HOSPADM

## 2022-04-11 RX ADMIN — HYDROMORPHONE HYDROCHLORIDE 0.5 MG: 1 INJECTION, SOLUTION INTRAMUSCULAR; INTRAVENOUS; SUBCUTANEOUS at 12:39

## 2022-04-11 RX ADMIN — EPHEDRINE SULFATE 10 MG: 50 INJECTION INTRAVENOUS at 09:05

## 2022-04-11 RX ADMIN — EPHEDRINE SULFATE 10 MG: 50 INJECTION INTRAVENOUS at 09:52

## 2022-04-11 RX ADMIN — PROPOFOL 120 MCG/KG/MIN: 10 INJECTION, EMULSION INTRAVENOUS at 07:53

## 2022-04-11 RX ADMIN — FENTANYL CITRATE 100 MCG: 50 INJECTION, SOLUTION INTRAMUSCULAR; INTRAVENOUS at 08:33

## 2022-04-11 RX ADMIN — ESMOLOL HYDROCHLORIDE 5 MG: 10 INJECTION, SOLUTION INTRAVENOUS at 11:04

## 2022-04-11 RX ADMIN — SODIUM CHLORIDE, SODIUM LACTATE, POTASSIUM CHLORIDE, AND CALCIUM CHLORIDE: .6; .31; .03; .02 INJECTION, SOLUTION INTRAVENOUS at 07:44

## 2022-04-11 RX ADMIN — SODIUM CHLORIDE, PRESERVATIVE FREE 5 MG: 5 INJECTION INTRAVENOUS at 12:38

## 2022-04-11 RX ADMIN — DIAZEPAM 5 MG: 5 TABLET ORAL at 12:41

## 2022-04-11 RX ADMIN — SODIUM CHLORIDE: 9 INJECTION, SOLUTION INTRAVENOUS at 14:31

## 2022-04-11 RX ADMIN — CEFAZOLIN SODIUM 2000 MG: 10 INJECTION, POWDER, FOR SOLUTION INTRAVENOUS at 07:54

## 2022-04-11 RX ADMIN — ONDANSETRON 4 MG: 2 INJECTION INTRAMUSCULAR; INTRAVENOUS at 12:36

## 2022-04-11 RX ADMIN — SODIUM CHLORIDE, SODIUM LACTATE, POTASSIUM CHLORIDE, AND CALCIUM CHLORIDE: .6; .31; .03; .02 INJECTION, SOLUTION INTRAVENOUS at 08:57

## 2022-04-11 RX ADMIN — Medication 120 MG: at 07:53

## 2022-04-11 RX ADMIN — ONDANSETRON 4 MG: 2 INJECTION INTRAMUSCULAR; INTRAVENOUS at 17:29

## 2022-04-11 RX ADMIN — CARBAMAZEPINE 600 MG: 200 TABLET, EXTENDED RELEASE ORAL at 14:29

## 2022-04-11 RX ADMIN — HYDROMORPHONE HYDROCHLORIDE 1 MG: 2 INJECTION, SOLUTION INTRAMUSCULAR; INTRAVENOUS; SUBCUTANEOUS at 10:04

## 2022-04-11 RX ADMIN — FENTANYL CITRATE 50 MCG: 50 INJECTION, SOLUTION INTRAMUSCULAR; INTRAVENOUS at 08:18

## 2022-04-11 RX ADMIN — REMIFENTANIL HYDROCHLORIDE 0.1 MCG/KG/MIN: 1 INJECTION, POWDER, LYOPHILIZED, FOR SOLUTION INTRAVENOUS at 07:53

## 2022-04-11 RX ADMIN — DEXAMETHASONE 4 MG: 4 TABLET ORAL at 14:29

## 2022-04-11 RX ADMIN — SERTRALINE HYDROCHLORIDE 50 MG: 50 TABLET ORAL at 14:29

## 2022-04-11 RX ADMIN — FENTANYL CITRATE 50 MCG: 50 INJECTION, SOLUTION INTRAMUSCULAR; INTRAVENOUS at 07:53

## 2022-04-11 RX ADMIN — MANNITOL 25 G: 20 INJECTION, SOLUTION INTRAVENOUS at 08:04

## 2022-04-11 RX ADMIN — ONDANSETRON 4 MG: 2 INJECTION INTRAMUSCULAR; INTRAVENOUS at 23:47

## 2022-04-11 RX ADMIN — HYDROMORPHONE HYDROCHLORIDE 0.5 MG: 1 INJECTION, SOLUTION INTRAMUSCULAR; INTRAVENOUS; SUBCUTANEOUS at 11:45

## 2022-04-11 RX ADMIN — LIDOCAINE HYDROCHLORIDE 60 MG: 20 INJECTION, SOLUTION INTRAVENOUS at 07:53

## 2022-04-11 RX ADMIN — ONDANSETRON 4 MG: 2 INJECTION INTRAMUSCULAR; INTRAVENOUS at 10:12

## 2022-04-11 RX ADMIN — EPHEDRINE SULFATE 10 MG: 50 INJECTION INTRAVENOUS at 08:59

## 2022-04-11 RX ADMIN — EPHEDRINE SULFATE 15 MG: 50 INJECTION INTRAVENOUS at 09:12

## 2022-04-11 RX ADMIN — DEXAMETHASONE 4 MG: 4 TABLET ORAL at 20:03

## 2022-04-11 RX ADMIN — DEXAMETHASONE SODIUM PHOSPHATE 10 MG: 4 INJECTION, SOLUTION INTRAMUSCULAR; INTRAVENOUS at 07:53

## 2022-04-11 RX ADMIN — ACETAMINOPHEN 650 MG: 325 TABLET ORAL at 13:03

## 2022-04-11 RX ADMIN — EPHEDRINE SULFATE 5 MG: 50 INJECTION INTRAVENOUS at 10:03

## 2022-04-11 RX ADMIN — SODIUM CHLORIDE: 9 INJECTION, SOLUTION INTRAVENOUS at 23:01

## 2022-04-11 RX ADMIN — HYDROMORPHONE HYDROCHLORIDE 0.5 MG: 1 INJECTION, SOLUTION INTRAMUSCULAR; INTRAVENOUS; SUBCUTANEOUS at 23:55

## 2022-04-11 RX ADMIN — PHENYLEPHRINE HYDROCHLORIDE 100 MCG: 10 INJECTION, SOLUTION INTRAMUSCULAR; INTRAVENOUS; SUBCUTANEOUS at 10:44

## 2022-04-11 RX ADMIN — SODIUM CHLORIDE, POTASSIUM CHLORIDE, SODIUM LACTATE AND CALCIUM CHLORIDE: 600; 310; 30; 20 INJECTION, SOLUTION INTRAVENOUS at 07:27

## 2022-04-11 RX ADMIN — SODIUM CHLORIDE, PRESERVATIVE FREE 10 ML: 5 INJECTION INTRAVENOUS at 20:05

## 2022-04-11 RX ADMIN — SODIUM CHLORIDE, SODIUM LACTATE, POTASSIUM CHLORIDE, CALCIUM CHLORIDE: 600; 310; 30; 20 INJECTION, SOLUTION INTRAVENOUS at 07:44

## 2022-04-11 RX ADMIN — CARBAMAZEPINE 600 MG: 200 TABLET, EXTENDED RELEASE ORAL at 20:03

## 2022-04-11 ASSESSMENT — PULMONARY FUNCTION TESTS
PIF_VALUE: 14
PIF_VALUE: 19
PIF_VALUE: 14
PIF_VALUE: 20
PIF_VALUE: 19
PIF_VALUE: 18
PIF_VALUE: 19
PIF_VALUE: 14
PIF_VALUE: 19
PIF_VALUE: 16
PIF_VALUE: 14
PIF_VALUE: 19
PIF_VALUE: 18
PIF_VALUE: 1
PIF_VALUE: 16
PIF_VALUE: 18
PIF_VALUE: 19
PIF_VALUE: 18
PIF_VALUE: 19
PIF_VALUE: 18
PIF_VALUE: 19
PIF_VALUE: 16
PIF_VALUE: 16
PIF_VALUE: 17
PIF_VALUE: 19
PIF_VALUE: 3
PIF_VALUE: 19
PIF_VALUE: 14
PIF_VALUE: 19
PIF_VALUE: 19
PIF_VALUE: 14
PIF_VALUE: 19
PIF_VALUE: 15
PIF_VALUE: 14
PIF_VALUE: 18
PIF_VALUE: 12
PIF_VALUE: 19
PIF_VALUE: 3
PIF_VALUE: 18
PIF_VALUE: 19
PIF_VALUE: 19
PIF_VALUE: 14
PIF_VALUE: 15
PIF_VALUE: 1
PIF_VALUE: 16
PIF_VALUE: 18
PIF_VALUE: 14
PIF_VALUE: 19
PIF_VALUE: 1
PIF_VALUE: 19
PIF_VALUE: 14
PIF_VALUE: 14
PIF_VALUE: 15
PIF_VALUE: 18
PIF_VALUE: 19
PIF_VALUE: 19
PIF_VALUE: 36
PIF_VALUE: 18
PIF_VALUE: 16
PIF_VALUE: 19
PIF_VALUE: 18
PIF_VALUE: 15
PIF_VALUE: 18
PIF_VALUE: 14
PIF_VALUE: 18
PIF_VALUE: 12
PIF_VALUE: 19
PIF_VALUE: 15
PIF_VALUE: 16
PIF_VALUE: 14
PIF_VALUE: 17
PIF_VALUE: 2
PIF_VALUE: 1
PIF_VALUE: 14
PIF_VALUE: 18
PIF_VALUE: 14
PIF_VALUE: 19
PIF_VALUE: 14
PIF_VALUE: 17
PIF_VALUE: 15
PIF_VALUE: 19
PIF_VALUE: 17
PIF_VALUE: 19
PIF_VALUE: 19
PIF_VALUE: 18
PIF_VALUE: 3
PIF_VALUE: 19
PIF_VALUE: 13
PIF_VALUE: 19
PIF_VALUE: 2
PIF_VALUE: 19
PIF_VALUE: 14
PIF_VALUE: 19
PIF_VALUE: 16
PIF_VALUE: 19
PIF_VALUE: 16
PIF_VALUE: 19
PIF_VALUE: 19
PIF_VALUE: 2
PIF_VALUE: 16
PIF_VALUE: 19
PIF_VALUE: 15
PIF_VALUE: 19
PIF_VALUE: 18
PIF_VALUE: 19
PIF_VALUE: 14
PIF_VALUE: 14
PIF_VALUE: 18
PIF_VALUE: 19
PIF_VALUE: 14
PIF_VALUE: 19
PIF_VALUE: 16
PIF_VALUE: 3
PIF_VALUE: 15
PIF_VALUE: 19
PIF_VALUE: 19
PIF_VALUE: 15
PIF_VALUE: 14
PIF_VALUE: 19
PIF_VALUE: 16
PIF_VALUE: 14
PIF_VALUE: 14
PIF_VALUE: 12
PIF_VALUE: 19
PIF_VALUE: 19
PIF_VALUE: 3
PIF_VALUE: 19
PIF_VALUE: 15
PIF_VALUE: 20
PIF_VALUE: 14
PIF_VALUE: 8
PIF_VALUE: 14
PIF_VALUE: 14
PIF_VALUE: 19
PIF_VALUE: 19
PIF_VALUE: 14
PIF_VALUE: 14
PIF_VALUE: 19
PIF_VALUE: 18
PIF_VALUE: 14
PIF_VALUE: 12
PIF_VALUE: 19
PIF_VALUE: 12
PIF_VALUE: 14
PIF_VALUE: 19
PIF_VALUE: 19
PIF_VALUE: 18
PIF_VALUE: 19
PIF_VALUE: 19
PIF_VALUE: 14
PIF_VALUE: 19
PIF_VALUE: 14
PIF_VALUE: 19
PIF_VALUE: 19
PIF_VALUE: 12
PIF_VALUE: 2
PIF_VALUE: 16
PIF_VALUE: 2
PIF_VALUE: 14
PIF_VALUE: 19
PIF_VALUE: 10
PIF_VALUE: 14
PIF_VALUE: 19
PIF_VALUE: 14
PIF_VALUE: 14
PIF_VALUE: 19
PIF_VALUE: 16
PIF_VALUE: 19
PIF_VALUE: 6
PIF_VALUE: 19
PIF_VALUE: 2
PIF_VALUE: 19
PIF_VALUE: 15
PIF_VALUE: 18
PIF_VALUE: 16
PIF_VALUE: 12
PIF_VALUE: 14
PIF_VALUE: 19
PIF_VALUE: 14
PIF_VALUE: 12
PIF_VALUE: 19
PIF_VALUE: 18
PIF_VALUE: 19
PIF_VALUE: 19
PIF_VALUE: 14

## 2022-04-11 ASSESSMENT — PAIN SCALES - GENERAL
PAINLEVEL_OUTOF10: 7
PAINLEVEL_OUTOF10: 0
PAINLEVEL_OUTOF10: 4
PAINLEVEL_OUTOF10: 0
PAINLEVEL_OUTOF10: 9
PAINLEVEL_OUTOF10: 7
PAINLEVEL_OUTOF10: 4
PAINLEVEL_OUTOF10: 0
PAINLEVEL_OUTOF10: 6

## 2022-04-11 ASSESSMENT — PAIN DESCRIPTION - PAIN TYPE
TYPE: ACUTE PAIN;SURGICAL PAIN
TYPE: ACUTE PAIN;SURGICAL PAIN
TYPE: SURGICAL PAIN
TYPE: ACUTE PAIN;SURGICAL PAIN
TYPE: ACUTE PAIN;SURGICAL PAIN

## 2022-04-11 ASSESSMENT — PAIN DESCRIPTION - DESCRIPTORS
DESCRIPTORS: ACHING
DESCRIPTORS: HEADACHE
DESCRIPTORS: HEADACHE
DESCRIPTORS: HEADACHE;DISCOMFORT
DESCRIPTORS: ACHING

## 2022-04-11 ASSESSMENT — PAIN DESCRIPTION - LOCATION
LOCATION: HEAD

## 2022-04-11 ASSESSMENT — PAIN DESCRIPTION - ORIENTATION
ORIENTATION: LEFT

## 2022-04-11 ASSESSMENT — PAIN DESCRIPTION - FREQUENCY
FREQUENCY: CONTINUOUS
FREQUENCY: INTERMITTENT
FREQUENCY: CONTINUOUS
FREQUENCY: INTERMITTENT

## 2022-04-11 ASSESSMENT — PAIN DESCRIPTION - PROGRESSION
CLINICAL_PROGRESSION: NOT CHANGED
CLINICAL_PROGRESSION: GRADUALLY WORSENING
CLINICAL_PROGRESSION: GRADUALLY WORSENING

## 2022-04-11 ASSESSMENT — PAIN DESCRIPTION - ONSET
ONSET: ON-GOING

## 2022-04-11 ASSESSMENT — LIFESTYLE VARIABLES: SMOKING_STATUS: 0

## 2022-04-11 NOTE — PROGRESS NOTES
Complete report given to [facility], RN taking over care of patient in PACU.

## 2022-04-11 NOTE — PROGRESS NOTES
Patient Art line not functioning, dampened and would not return blood, removed, covering resident notified, will continue to monitor.

## 2022-04-11 NOTE — ANESTHESIA PRE PROCEDURE
Department of Anesthesiology  Preprocedure Note       Name:  Abiel Smith   Age:  29 y.o.  :  1985                                          MRN:  [id number]         Date:  2022      Surgeon: Alysa Antoine):  Paulo Kaur MD    Procedure: Procedure(s):  LEFT MICROVASCULAR DECOMPRESSION    Medications prior to admission:   Prior to Admission medications    Medication Sig Start Date End Date Taking? Authorizing Provider   carBAMazepine (TEGRETOL XR) 400 MG extended release tablet Take 400 mg by mouth 2 times daily TOTAL OF 600MG 2X PER DAY 21  Yes Historical Provider, MD   carBAMazepine (TEGRETOL XR) 100 MG extended release tablet Take 200 mg by mouth 2 times daily TAKES TOTAL  MG 2X PER DAY 21  Yes Historical Provider, MD   vitamin B-2 (RIBOFLAVIN) 100 MG TABS tablet riboflavin (vitamin B2) 25 mg tablet  Patient not taking: Reported on 2022    Historical Provider, MD   co-enzyme Q-10 30 MG capsule COQ-10 30 MG CAPS    Historical Provider, MD   aspirin-acetaminophen-caffeine (Gabe Smack) 134-617-13 MG per tablet EXCEDRIN MIGRAINE 250-250-65 MG TABS    Historical Provider, MD   Multiple Vitamin (MULTI-VITAMINS) TABS MULTI-VITAMINS TABS  Patient not taking: Reported on 2022    Historical Provider, MD   Magnesium 400 MG TABS Take by mouth daily  Patient not taking: Reported on 2022    Historical Provider, MD   sertraline (ZOLOFT) 50 MG tablet Take 50 mg by mouth daily 20   Historical Provider, MD       Current medications:    Current Facility-Administered Medications   Medication Dose Route Frequency Provider Last Rate Last Admin    ceFAZolin (ANCEF) 2000 mg in dextrose 5 % 50 mL IVPB  2,000 mg IntraVENous Once Chaz Gonzalez MD        lactated ringers infusion   IntraVENous Continuous Erlene Nurse,  mL/hr at 22 0727 New Bag at 22       Allergies:     Allergies   Allergen Reactions    Phenylephrine-Dm-Gg-Apap Rash    Sulfa Antibiotics Rash       Problem List:    Patient Active Problem List   Diagnosis Code    Rupture of fetal membranes [id number]       Past Medical History:        Diagnosis Date    Anxiety     Mild depression ([facility] [address]     Edis and senior year of high school    Trigeminal neuralgia        Past Surgical History:        Procedure Laterality Date     SECTION         Social History:    Social History     Tobacco Use    Smoking status: Never Smoker    Smokeless tobacco: Never Used   Substance Use Topics    Alcohol use: Not Currently                                Counseling given: Not Answered      Vital Signs (Current):   Vitals:    22 1303 22 0719   BP:  (!) 135/90   Pulse:  108   Resp:  18   Temp:  98.1 °F (36.7 °C)   TempSrc:  Temporal   SpO2:  100%   Weight: 127 lb (57.6 kg) 127 lb (57.6 kg)   Height: 5' 3\" (1.6 m) 5' 3\" (1.6 m)                                              BP Readings from Last 3 Encounters:   22 (!) 135/90   21 (!) 130/96   20 (!) 156/98       NPO Status: Time of last liquid consumption:                         Time of last solid consumption:                         Date of last liquid consumption: 04/10/22                        Date of last solid food consumption: 04/10/22    BMI:   Wt Readings from Last 3 Encounters:   22 127 lb (57.6 kg)   21 124 lb (56.2 kg)   20 125 lb (56.7 kg)     Body mass index is 22.5 kg/m².     CBC:   Lab Results   Component Value Date    WBC 10.3 2012    RBC 3.26 2012    HGB 10.1 2012    HCT 29.8 2012    MCV 91.6 2012    RDW 14.2 2012     2012       CMP:   Lab Results   Component Value Date     2012    K 4.0 2012     2012    CO2 24 2012    BUN 10 2012    CREATININE 0.6 2012    GFRAA >60 2012    GLUCOSE 80 2012    PROT 6.7 2012    CALCIUM 8.8 2012    BILITOT 0.51 2012    ALKPHOS 116 08/26/2012    AST 21 08/26/2012    ALT 28 08/26/2012       POC Tests: No results for input(s): POCGLU, POCNA, POCK, POCCL, POCBUN, POCHEMO, POCHCT in the last 72 hours. Coags:   Lab Results   Component Value Date    PROTIME 9.7 08/26/2012    INR 0.84 08/26/2012    APTT 25.9 08/26/2012       HCG (If Applicable):   Lab Results   Component Value Date    PREGTESTUR Negative 04/11/2022        ABGs: No results found for: PHART, PO2ART, AUJ4WRX, MCC3OUR, BEART, H3FEXLQQ     Type & Screen (If Applicable):  Lab Results   Component Value Date    LABABO O 08/27/2012    LABRH Positive 08/27/2012       Drug/Infectious Status (If Applicable):  No results found for: HIV, HEPCAB    COVID-19 Screening (If Applicable): No results found for: COVID19        Anesthesia Evaluation  Patient summary reviewed and Nursing notes reviewed no history of anesthetic complications:   Airway: Mallampati: I  TM distance: >3 FB   Neck ROM: full  Mouth opening: > = 3 FB Dental: normal exam         Pulmonary:       (-) not a current smoker                           Cardiovascular:  Exercise tolerance: good (>4 METS),           Rhythm: regular  Rate: normal                    Neuro/Psych:   (+) depression/anxiety              ROS comment: +trigeminal neuralgia GI/Hepatic/Renal: Neg GI/Hepatic/Renal ROS            Endo/Other: Negative Endo/Other ROS                    Abdominal:             Vascular: Other Findings:             Anesthesia Plan      general and TIVA     ASA 2       Induction: intravenous. MIPS: Prophylactic antiemetics administered. Anesthetic plan and risks discussed with patient. Plan discussed with CRNA.                   Kayy Adan DO   4/11/2022

## 2022-04-11 NOTE — CONSULTS
Internal Medicine  PGY 1  History & Physical      CC Facial pain     History Obtained From:  patient    HISTORY OF PRESENT ILLNESS:   Pt is a 30 yo F with PMHx of MDD, anxiety who presents with left sided facial pain. Patient comes into the hospital for left microvascular decompression. Patient denies any fevers, chills, nausea or vomiting. Pt endorsed having episodes of hypotension from time to time. Patient was seen post op. Pt has no drains placed. Patient is awake with mild pain behind her left ear from the surgical incision. The staples look clean with no erythema. Past Medical History:        Diagnosis Date    Anxiety     Mild depression ([address]     Edis and senior year of high school    Trigeminal neuralgia    ·     Past Surgical History:        Procedure Laterality Date     SECTION      CRANIOTOMY Left 2022    LEFT MICROVASCULAR DECOMPRESSION performed by Carmen Holbrook.  Yusuf Michelle MD at [facility]note 3] [address]   ·     Medications Priorto Admission:    · Medications Prior to Admission: carBAMazepine (TEGRETOL XR) 400 MG extended release tablet, Take 400 mg by mouth 2 times daily TOTAL OF 600MG 2X PER DAY  · carBAMazepine (TEGRETOL XR) 100 MG extended release tablet, Take 200 mg by mouth 2 times daily TAKES TOTAL  MG 2X PER DAY  · vitamin B-2 (RIBOFLAVIN) 100 MG TABS tablet, riboflavin (vitamin B2) 25 mg tablet (Patient not taking: Reported on 2022)  · co-enzyme Q-10 30 MG capsule, COQ-10 30 MG CAPS  · aspirin-acetaminophen-caffeine (EXCEDRIN MIGRAINE) 250-250-65 MG per tablet, EXCEDRIN MIGRAINE 250-250-65 MG TABS  · Multiple Vitamin (MULTI-VITAMINS) TABS, MULTI-VITAMINS TABS (Patient not taking: Reported on 2022)  · Magnesium 400 MG TABS, Take by mouth daily (Patient not taking: Reported on 2022)  · sertraline (ZOLOFT) 50 MG tablet, Take 50 mg by mouth daily    Allergies:  Phenylephrine-dm-gg-apap and Sulfa antibiotics    Social History:   · TOBACCO:   reports that she has never smoked. She has never used smokeless tobacco.  · ETOH:   reports previous alcohol use. · DRUGS :   · Patient currently lives alone  ·   Family History:       Problem Relation Age of Onset    High Blood Pressure Father     Cancer Other 48        breast    Cancer Maternal Grandmother 68        breast cancer    Heart Disease Maternal Grandmother     Diabetes Paternal Grandfather     Heart Disease Paternal Grandmother         murmur   ·     Review of Systems   All other systems reviewed and are negative. ROS: A 10 point review of systems was conducted, significant findings as noted in HPI. Physical Exam  Vitals and nursing note reviewed. Constitutional:       Appearance: Normal appearance. She is normal weight. HENT:      Head: Normocephalic. Comments: Left post auricular incision     Nose: Nose normal.      Mouth/Throat:      Mouth: Mucous membranes are moist.      Pharynx: Oropharynx is clear. Eyes:      Extraocular Movements: Extraocular movements intact. Conjunctiva/sclera: Conjunctivae normal.      Pupils: Pupils are equal, round, and reactive to light. Cardiovascular:      Rate and Rhythm: Normal rate and regular rhythm. Pulses: Normal pulses. Heart sounds: Normal heart sounds. Pulmonary:      Effort: Pulmonary effort is normal.      Breath sounds: Normal breath sounds. Abdominal:      General: Abdomen is flat. Bowel sounds are normal.      Palpations: Abdomen is soft. Skin:     General: Skin is warm. Capillary Refill: Capillary refill takes less than 2 seconds. Neurological:      General: No focal deficit present. Mental Status: She is alert and oriented to person, place, and time. Mental status is at baseline. Physical exam:       Vitals:    04/11/22 1600   BP: 108/70   Pulse: 99   Resp: 19   Temp: 97.8 °F (36.6 °C)   SpO2: 99%       DATA:    Labs:  CBC: No results for input(s): WBC, HGB, HCT, PLT in the last 72 hours.     BMP: No results for input(s): NA, K, CL, CO2, BUN, CREATININE, GLUCOSE, PHOS in the last 72 hours. Invalid input(s):  CA  LFT's: No results for input(s): AST, ALT, ALB, BILITOT, ALKPHOS in the last 72 hours. Troponin: No results for input(s): TROPONINI in the last 72 hours. BNP:No results for input(s): BNP in the last 72 hours. ABGs: No results for input(s): PHART, DHD3MXN, PO2ART in the last 72 hours. INR: No results for input(s): INR in the last 72 hours. U/A:No results for input(s): NITRITE, COLORU, PHUR, LABCAST, WBCUA, RBCUA, MUCUS, TRICHOMONAS, YEAST, BACTERIA, CLARITYU, SPECGRAV, LEUKOCYTESUR, UROBILINOGEN, BILIRUBINUR, BLOODU, GLUCOSEU, AMORPHOUS in the last 72 hours. Invalid input(s): Erin Juarez    CT Head wo Contrast   Final Result   1. Postsurgical changes related to left microvascular decompression. Small amount of pneumocephalus. No acute hemorrhage. ASSESSMENT AND PLAN:  Ling Malone is a 30 yo F who comes into [facility] for s/p left microvascular decompression for trigeminal neuralgia.      Trigeminal Neuralgia s/p left microvascular decompression   Pt endorsed left faial pain.     -Continue Tegretlor  mg BID  -Decadron 4mg Q6   -Dilaudid for pain PRN  -Diazepam 5mg Q6 PRN for muscle spasm     MDD  -Continue sertaline 50mg WD        Will discuss with attending physician      Code Status:Full code  FEN: NPO  PPX: SCDs  DISPO: LAUREN Silva MD  4/11/2022,  6:16 PM

## 2022-04-11 NOTE — ANESTHESIA POSTPROCEDURE EVALUATION
Department of Anesthesiology  Postprocedure Note    Patient: Miguel Angel Hanks  MRN: [id number]  YOB: 1985  Date of evaluation: 4/11/2022  Time:  1:33 PM     Procedure Summary     Date: 04/11/22 Room / Location: [facility]    Anesthesia Start: 9032 Anesthesia Stop: 0278    Procedure: LEFT MICROVASCULAR DECOMPRESSION (Left Head) Diagnosis:       Trigeminal neuralgia      (Trigeminal neuralgia [G50.0])    Surgeons: Chris Templeton. Yusuf Michelle MD Responsible Provider: Jackson Pretty DO    Anesthesia Type: general, TIVA ASA Status: 2          Anesthesia Type: general, TIVA    Shante Phase I: Shante Score: 9    Shante Phase II:      Last vitals: Reviewed and per EMR flowsheets.        Anesthesia Post Evaluation    Patient location during evaluation: PACU  Patient participation: complete - patient participated  Level of consciousness: awake and alert  Airway patency: patent  Nausea & Vomiting: no nausea and no vomiting  Cardiovascular status: blood pressure returned to baseline  Respiratory status: acceptable  Hydration status: euvolemic

## 2022-04-11 NOTE — PROGRESS NOTES
4 Eyes Admission Assessment     I agree as the admission nurse that 2 RN's have performed a thorough Head to Toe Skin Assessment on the patient. ALL assessment sites listed below have been assessed on admission. Areas assessed by both nurses: Mallissa Payer   [x]   Head, Face, and Ears   [x]   Shoulders, Back, and Chest  [x]   Arms, Elbows, and Hands   [x]   Coccyx, Sacrum, and Ischium  [x]   Legs, Feet, and Heels        Does the Patient have Skin Breakdown?   No         Jamaal Prevention initiated:  Yes   Wound Care Orders initiated:  No      [facility] nurse consulted for Pressure Injury (Stage 3,4, Unstageable, DTI, NWPT, and Complex wounds) or Jamaal score 18 or lower:  Yes      Nurse 1 eSignature: Electronically signed by Shelly Clinton RN on 4/11/22 at 6:37 PM EDT    **SHARE this note so that the co-signing nurse is able to place an eSignature**    Nurse 2 eSignature: Electronically signed by Sailaja Acosta RN on 4/11/22 at 6:34 PM EDT

## 2022-04-11 NOTE — PROGRESS NOTES
Patient admitted to PACU #17 from OR per ICU bed at 1115 s/p LEFT MICROVASCULAR DECOMPRESSION (Left Head). Report received at bedside in PACU per CRNA. Patient was reported to be hypotensive at times which she received medication for, see anesthesia record. No complications reported. Patient connected to PACU monitoring equipment. IVF's infusing with site unremarkable. Patient arrived to PACU responsive but not fully wakeful from anesthesia but with respirations easy, even and regular with no pain noted or verbalized. Left side of head surgical dressing poorly adhered to head with sutures noted underneath. No drainage noted. Parrish catheter removed in OR. Left radial arterial line in place. No further changes. Will continue to monitor.

## 2022-04-11 NOTE — PROGRESS NOTES
PACU Transfer to Floor Note    Procedure(s):  LEFT MICROVASCULAR DECOMPRESSION    Current Allergies: Phenylephrine-dm-gg-apap and Sulfa antibiotics    Pt meets criteria as per Shante Score and ASPAN Standards to transfer to next phase of care. No results for input(s): POCGLU in the last 72 hours. Vitals:    04/11/22 1300   BP: 116/75   Pulse: 109   Resp: 14   Temp: 96.7 °F (35.9 °C)   SpO2: 99%         SpO2: 99 %    O2 Flow Rate (L/min): 0 L/min      Intake/Output Summary (Last 24 hours) at 4/11/2022 1310  Last data filed at 4/11/2022 1300  Gross per 24 hour   Intake 2305 ml   Output 3300 ml   Net -995 ml     Pt has surgical incision to left side of head (behind left ear), closed with staples and open to air. No drainage, no drains. Pain assessment:  present - adequately treated    Pain Level: 6    Patient was assessed for alterations to skin integrity. There were no alterations observed. Is patient incontinent: no    Handoff report given at bedside to Michael, [facility].   Family,  Gaby Burkett, updated and directed to pt room      4/11/2022 1:10 PM

## 2022-04-11 NOTE — H&P
[facility]    [id number]    [facility] Same Day Surgery Update H & P  Department of General Surgery   Surgical Service   Pre-operative History and Physical  Last H & P within the last 30 days. DIAGNOSIS:   Trigeminal neuralgia [G50.0]    Procedure(s):  LEFT MICROVASCULAR DECOMPRESSION    History obtained from: Patient interview and EHR      HISTORY OF PRESENT ILLNESS:   The patient is a 29 y.o. female with c/o left sided facial pain, consistent with trigeminal neuralgia. Their symptoms have been unrelieved with conservative therapy and they presents today for the above procedure. Illness Screening: Patient denies fever, chills, worsening cough, or close contact with sick individuals. Past Medical History:        Diagnosis Date    Anxiety     Mild depression ([address]     [place] and senior year of high school    Trigeminal neuralgia      Past Surgical History:        Procedure Laterality Date     SECTION           Medications Prior to Admission:      Prior to Admission medications    Medication Sig Start Date End Date Taking?  Authorizing Provider   carBAMazepine (TEGRETOL XR) 400 MG extended release tablet Take 400 mg by mouth 2 times daily TOTAL OF 600MG 2X PER DAY 21  Yes Historical Provider, MD   carBAMazepine (TEGRETOL XR) 100 MG extended release tablet Take 200 mg by mouth 2 times daily TAKES TOTAL  MG 2X PER DAY 21  Yes Historical Provider, MD   Magnesium 400 MG TABS Take by mouth daily   Yes Historical Provider, MD   vitamin B-2 (RIBOFLAVIN) 100 MG TABS tablet riboflavin (vitamin B2) 25 mg tablet    Historical Provider, MD   co-enzyme Q-10 30 MG capsule COQ-10 30 MG CAPS    Historical Provider, MD   aspirin-acetaminophen-caffeine (Court Satchel) 858-660-50 MG per tablet EXCEDRIN MIGRAINE 250-250-65 MG TABS    Historical Provider, MD   Multiple Vitamin (MULTI-VITAMINS) TABS MULTI-VITAMINS TABS    Historical Provider, MD   sertraline (ZOLOFT) 50 MG tablet Take 50 mg by mouth daily 6/27/20   Historical Provider, MD         Allergies:  Phenylephrine-dm-gg-apap and Sulfa antibiotics    PHYSICAL EXAM:      BP (!) 135/90   Pulse 108   Temp 98.1 °F (36.7 °C) (Temporal)   Resp 18   Ht 5' 3\" (1.6 m)   Wt 127 lb (57.6 kg)   SpO2 100%   BMI 22.50 kg/m²      Airway:  Airway patent with no audible stridor    Heart:  Regular rate and rhythm, No murmur noted    Lungs:  No increased work of breathing, good air exchange, clear to auscultation bilaterally, no crackles or wheezing    Abdomen:  Soft, non-distended, non-tender, no rebound tenderness or guarding, and no masses palpated    ASSESSMENT AND PLAN     Patient is a 29 y.o. female with above specified procedure planned. 1.  The patients history and physical was obtained and signed off by the pre-admission testing department. Patient seen and focused exam done today- no new changes since last physical exam on 4/8/22    2. Access to ancillary services are available per request of the provider.     ZHANNA Gimenez - CNP     4/11/2022

## 2022-04-12 LAB
ANION GAP SERPL CALCULATED.3IONS-SCNC: 11 MMOL/L (ref 3–16)
APTT: 28.8 SEC (ref 26.2–38.6)
BASOPHILS ABSOLUTE: 0.1 K/UL (ref 0–0.2)
BASOPHILS RELATIVE PERCENT: 0.6 %
BUN BLDV-MCNC: 7 MG/DL (ref 7–20)
CALCIUM SERPL-MCNC: 8.5 MG/DL (ref 8.3–10.6)
CHLORIDE BLD-SCNC: 103 MMOL/L (ref 99–110)
CO2: 22 MMOL/L (ref 21–32)
CREAT SERPL-MCNC: <0.5 MG/DL (ref 0.6–1.1)
EKG ATRIAL RATE: 85 BPM
EKG DIAGNOSIS: NORMAL
EKG P AXIS: 56 DEGREES
EKG P-R INTERVAL: 148 MS
EKG Q-T INTERVAL: 374 MS
EKG QRS DURATION: 88 MS
EKG QTC CALCULATION (BAZETT): 445 MS
EKG R AXIS: 16 DEGREES
EKG T AXIS: 36 DEGREES
EKG VENTRICULAR RATE: 85 BPM
EOSINOPHILS ABSOLUTE: 0 K/UL (ref 0–0.6)
EOSINOPHILS RELATIVE PERCENT: 0 %
GFR AFRICAN AMERICAN: >60
GFR NON-AFRICAN AMERICAN: >60
GLUCOSE BLD-MCNC: 125 MG/DL (ref 70–99)
HCT VFR BLD CALC: 31 % (ref 36–48)
HEMOGLOBIN: 10.1 G/DL (ref 12–16)
INR BLD: 1.02 (ref 0.88–1.12)
LYMPHOCYTES ABSOLUTE: 0.8 K/UL (ref 1–5.1)
LYMPHOCYTES RELATIVE PERCENT: 7.2 %
MCH RBC QN AUTO: 26.9 PG (ref 26–34)
MCHC RBC AUTO-ENTMCNC: 32.6 G/DL (ref 31–36)
MCV RBC AUTO: 82.5 FL (ref 80–100)
MONOCYTES ABSOLUTE: 0.5 K/UL (ref 0–1.3)
MONOCYTES RELATIVE PERCENT: 4.5 %
NEUTROPHILS ABSOLUTE: 9.8 K/UL (ref 1.7–7.7)
NEUTROPHILS RELATIVE PERCENT: 87.7 %
PDW BLD-RTO: 14 % (ref 12.4–15.4)
PLATELET # BLD: 258 K/UL (ref 135–450)
PMV BLD AUTO: 8.2 FL (ref 5–10.5)
POTASSIUM SERPL-SCNC: 4.2 MMOL/L (ref 3.5–5.1)
PROTHROMBIN TIME: 11.5 SEC (ref 9.9–12.7)
RBC # BLD: 3.75 M/UL (ref 4–5.2)
SODIUM BLD-SCNC: 136 MMOL/L (ref 136–145)
WBC # BLD: 11.1 K/UL (ref 4–11)

## 2022-04-12 PROCEDURE — 97161 PT EVAL LOW COMPLEX 20 MIN: CPT

## 2022-04-12 PROCEDURE — 85610 PROTHROMBIN TIME: CPT

## 2022-04-12 PROCEDURE — 6360000002 HC RX W HCPCS: Performed by: PHYSICIAN ASSISTANT

## 2022-04-12 PROCEDURE — 93010 ELECTROCARDIOGRAM REPORT: CPT | Performed by: INTERNAL MEDICINE

## 2022-04-12 PROCEDURE — 94761 N-INVAS EAR/PLS OXIMETRY MLT: CPT

## 2022-04-12 PROCEDURE — C9113 INJ PANTOPRAZOLE SODIUM, VIA: HCPCS | Performed by: NURSE PRACTITIONER

## 2022-04-12 PROCEDURE — 80048 BASIC METABOLIC PNL TOTAL CA: CPT

## 2022-04-12 PROCEDURE — 6360000002 HC RX W HCPCS: Performed by: STUDENT IN AN ORGANIZED HEALTH CARE EDUCATION/TRAINING PROGRAM

## 2022-04-12 PROCEDURE — 93005 ELECTROCARDIOGRAM TRACING: CPT | Performed by: STUDENT IN AN ORGANIZED HEALTH CARE EDUCATION/TRAINING PROGRAM

## 2022-04-12 PROCEDURE — 2060000000 HC ICU INTERMEDIATE R&B

## 2022-04-12 PROCEDURE — 97165 OT EVAL LOW COMPLEX 30 MIN: CPT

## 2022-04-12 PROCEDURE — 6370000000 HC RX 637 (ALT 250 FOR IP): Performed by: NURSE PRACTITIONER

## 2022-04-12 PROCEDURE — 6360000002 HC RX W HCPCS: Performed by: NURSE PRACTITIONER

## 2022-04-12 PROCEDURE — 83540 ASSAY OF IRON: CPT

## 2022-04-12 PROCEDURE — 85025 COMPLETE CBC W/AUTO DIFF WBC: CPT

## 2022-04-12 PROCEDURE — 97535 SELF CARE MNGMENT TRAINING: CPT

## 2022-04-12 PROCEDURE — 97530 THERAPEUTIC ACTIVITIES: CPT

## 2022-04-12 PROCEDURE — 2580000003 HC RX 258: Performed by: PHYSICIAN ASSISTANT

## 2022-04-12 PROCEDURE — 36415 COLL VENOUS BLD VENIPUNCTURE: CPT

## 2022-04-12 PROCEDURE — 85730 THROMBOPLASTIN TIME PARTIAL: CPT

## 2022-04-12 PROCEDURE — 99222 1ST HOSP IP/OBS MODERATE 55: CPT | Performed by: INTERNAL MEDICINE

## 2022-04-12 PROCEDURE — 83550 IRON BINDING TEST: CPT

## 2022-04-12 PROCEDURE — 2580000003 HC RX 258: Performed by: NURSE PRACTITIONER

## 2022-04-12 RX ORDER — METOCLOPRAMIDE HYDROCHLORIDE 5 MG/ML
10 INJECTION INTRAMUSCULAR; INTRAVENOUS ONCE
Status: COMPLETED | OUTPATIENT
Start: 2022-04-12 | End: 2022-04-12

## 2022-04-12 RX ORDER — SENNA AND DOCUSATE SODIUM 50; 8.6 MG/1; MG/1
2 TABLET, FILM COATED ORAL DAILY
Status: DISCONTINUED | OUTPATIENT
Start: 2022-04-13 | End: 2022-04-13 | Stop reason: HOSPADM

## 2022-04-12 RX ORDER — ACETAMINOPHEN 500 MG
1000 TABLET ORAL EVERY 12 HOURS PRN
Status: DISCONTINUED | OUTPATIENT
Start: 2022-04-12 | End: 2022-04-13 | Stop reason: HOSPADM

## 2022-04-12 RX ORDER — PROCHLORPERAZINE EDISYLATE 5 MG/ML
10 INJECTION INTRAMUSCULAR; INTRAVENOUS EVERY 6 HOURS PRN
Status: DISCONTINUED | OUTPATIENT
Start: 2022-04-12 | End: 2022-04-13 | Stop reason: HOSPADM

## 2022-04-12 RX ORDER — DEXAMETHASONE 4 MG/1
4 TABLET ORAL EVERY 8 HOURS SCHEDULED
Status: DISCONTINUED | OUTPATIENT
Start: 2022-04-13 | End: 2022-04-13 | Stop reason: HOSPADM

## 2022-04-12 RX ORDER — ACETAMINOPHEN 500 MG
1000 TABLET ORAL EVERY 12 HOURS
Status: DISCONTINUED | OUTPATIENT
Start: 2022-04-12 | End: 2022-04-12

## 2022-04-12 RX ORDER — DEXAMETHASONE 4 MG/1
4 TABLET ORAL DAILY
Status: DISCONTINUED | OUTPATIENT
Start: 2022-04-16 | End: 2022-04-13 | Stop reason: HOSPADM

## 2022-04-12 RX ORDER — PANTOPRAZOLE SODIUM 40 MG/10ML
40 INJECTION, POWDER, LYOPHILIZED, FOR SOLUTION INTRAVENOUS DAILY
Status: DISCONTINUED | OUTPATIENT
Start: 2022-04-12 | End: 2022-04-13 | Stop reason: HOSPADM

## 2022-04-12 RX ORDER — DEXAMETHASONE 4 MG/1
4 TABLET ORAL DAILY
Status: DISCONTINUED | OUTPATIENT
Start: 2022-04-16 | End: 2022-04-12

## 2022-04-12 RX ORDER — SCOLOPAMINE TRANSDERMAL SYSTEM 1 MG/1
1 PATCH, EXTENDED RELEASE TRANSDERMAL
Status: DISCONTINUED | OUTPATIENT
Start: 2022-04-12 | End: 2022-04-13 | Stop reason: HOSPADM

## 2022-04-12 RX ORDER — DEXAMETHASONE SODIUM PHOSPHATE 4 MG/ML
4 INJECTION, SOLUTION INTRA-ARTICULAR; INTRALESIONAL; INTRAMUSCULAR; INTRAVENOUS; SOFT TISSUE EVERY 6 HOURS
Status: COMPLETED | OUTPATIENT
Start: 2022-04-12 | End: 2022-04-13

## 2022-04-12 RX ORDER — DEXAMETHASONE 4 MG/1
4 TABLET ORAL EVERY 12 HOURS SCHEDULED
Status: DISCONTINUED | OUTPATIENT
Start: 2022-04-14 | End: 2022-04-12

## 2022-04-12 RX ORDER — DEXAMETHASONE 4 MG/1
4 TABLET ORAL EVERY 12 HOURS SCHEDULED
Status: DISCONTINUED | OUTPATIENT
Start: 2022-04-14 | End: 2022-04-13 | Stop reason: HOSPADM

## 2022-04-12 RX ORDER — DEXAMETHASONE 4 MG/1
4 TABLET ORAL EVERY 8 HOURS SCHEDULED
Status: DISCONTINUED | OUTPATIENT
Start: 2022-04-13 | End: 2022-04-12

## 2022-04-12 RX ORDER — DEXAMETHASONE 4 MG/1
4 TABLET ORAL EVERY 6 HOURS SCHEDULED
Status: DISCONTINUED | OUTPATIENT
Start: 2022-04-12 | End: 2022-04-12

## 2022-04-12 RX ORDER — METHOCARBAMOL 750 MG/1
750 TABLET, FILM COATED ORAL EVERY 6 HOURS SCHEDULED
Status: DISCONTINUED | OUTPATIENT
Start: 2022-04-13 | End: 2022-04-13 | Stop reason: HOSPADM

## 2022-04-12 RX ORDER — POLYETHYLENE GLYCOL 3350 17 G/17G
17 POWDER, FOR SOLUTION ORAL DAILY
Status: DISCONTINUED | OUTPATIENT
Start: 2022-04-13 | End: 2022-04-13 | Stop reason: HOSPADM

## 2022-04-12 RX ADMIN — DEXAMETHASONE SODIUM PHOSPHATE 4 MG: 4 INJECTION, SOLUTION INTRAMUSCULAR; INTRAVENOUS at 18:16

## 2022-04-12 RX ADMIN — METHOCARBAMOL 1000 MG: 100 INJECTION, SOLUTION INTRAMUSCULAR; INTRAVENOUS at 12:42

## 2022-04-12 RX ADMIN — HYDROMORPHONE HYDROCHLORIDE 0.5 MG: 1 INJECTION, SOLUTION INTRAMUSCULAR; INTRAVENOUS; SUBCUTANEOUS at 03:13

## 2022-04-12 RX ADMIN — HEPARIN SODIUM 5000 UNITS: 5000 INJECTION INTRAVENOUS; SUBCUTANEOUS at 06:14

## 2022-04-12 RX ADMIN — DEXAMETHASONE SODIUM PHOSPHATE 4 MG: 4 INJECTION, SOLUTION INTRAMUSCULAR; INTRAVENOUS at 22:51

## 2022-04-12 RX ADMIN — ONDANSETRON 4 MG: 2 INJECTION INTRAMUSCULAR; INTRAVENOUS at 06:14

## 2022-04-12 RX ADMIN — HYDROMORPHONE HYDROCHLORIDE 0.25 MG: 1 INJECTION, SOLUTION INTRAMUSCULAR; INTRAVENOUS; SUBCUTANEOUS at 16:27

## 2022-04-12 RX ADMIN — HEPARIN SODIUM 5000 UNITS: 5000 INJECTION INTRAVENOUS; SUBCUTANEOUS at 12:19

## 2022-04-12 RX ADMIN — METHOCARBAMOL 1000 MG: 100 INJECTION, SOLUTION INTRAMUSCULAR; INTRAVENOUS at 20:23

## 2022-04-12 RX ADMIN — METOCLOPRAMIDE HYDROCHLORIDE 10 MG: 5 INJECTION INTRAMUSCULAR; INTRAVENOUS at 09:35

## 2022-04-12 RX ADMIN — SODIUM CHLORIDE: 9 INJECTION, SOLUTION INTRAVENOUS at 07:23

## 2022-04-12 RX ADMIN — PANTOPRAZOLE SODIUM 40 MG: 40 INJECTION, POWDER, FOR SOLUTION INTRAVENOUS at 12:24

## 2022-04-12 RX ADMIN — HEPARIN SODIUM 5000 UNITS: 5000 INJECTION INTRAVENOUS; SUBCUTANEOUS at 20:24

## 2022-04-12 RX ADMIN — DEXAMETHASONE SODIUM PHOSPHATE 4 MG: 4 INJECTION, SOLUTION INTRAMUSCULAR; INTRAVENOUS at 12:18

## 2022-04-12 RX ADMIN — HYDROMORPHONE HYDROCHLORIDE 0.5 MG: 1 INJECTION, SOLUTION INTRAMUSCULAR; INTRAVENOUS; SUBCUTANEOUS at 22:54

## 2022-04-12 RX ADMIN — DEXAMETHASONE 4 MG: 4 TABLET ORAL at 01:23

## 2022-04-12 RX ADMIN — PROCHLORPERAZINE EDISYLATE 10 MG: 5 INJECTION INTRAMUSCULAR; INTRAVENOUS at 18:16

## 2022-04-12 ASSESSMENT — PAIN DESCRIPTION - LOCATION
LOCATION: HEAD

## 2022-04-12 ASSESSMENT — PAIN - FUNCTIONAL ASSESSMENT
PAIN_FUNCTIONAL_ASSESSMENT: PREVENTS OR INTERFERES SOME ACTIVE ACTIVITIES AND ADLS
PAIN_FUNCTIONAL_ASSESSMENT: PREVENTS OR INTERFERES SOME ACTIVE ACTIVITIES AND ADLS
PAIN_FUNCTIONAL_ASSESSMENT: ACTIVITIES ARE NOT PREVENTED
PAIN_FUNCTIONAL_ASSESSMENT: PREVENTS OR INTERFERES SOME ACTIVE ACTIVITIES AND ADLS

## 2022-04-12 ASSESSMENT — PAIN DESCRIPTION - ORIENTATION
ORIENTATION: LEFT

## 2022-04-12 ASSESSMENT — PAIN DESCRIPTION - ONSET
ONSET: ON-GOING

## 2022-04-12 ASSESSMENT — PAIN DESCRIPTION - DESCRIPTORS
DESCRIPTORS: ACHING
DESCRIPTORS: ACHING;DISCOMFORT
DESCRIPTORS: ACHING
DESCRIPTORS: ACHING;DISCOMFORT
DESCRIPTORS: ACHING
DESCRIPTORS: ACHING

## 2022-04-12 ASSESSMENT — PAIN SCALES - GENERAL
PAINLEVEL_OUTOF10: 2
PAINLEVEL_OUTOF10: 0
PAINLEVEL_OUTOF10: 7
PAINLEVEL_OUTOF10: 1
PAINLEVEL_OUTOF10: 0
PAINLEVEL_OUTOF10: 7
PAINLEVEL_OUTOF10: 0
PAINLEVEL_OUTOF10: 7
PAINLEVEL_OUTOF10: 2
PAINLEVEL_OUTOF10: 5

## 2022-04-12 ASSESSMENT — ENCOUNTER SYMPTOMS
NAUSEA: 1
RESPIRATORY NEGATIVE: 1

## 2022-04-12 ASSESSMENT — PAIN DESCRIPTION - PAIN TYPE
TYPE: SURGICAL PAIN
TYPE: ACUTE PAIN

## 2022-04-12 ASSESSMENT — PAIN DESCRIPTION - FREQUENCY
FREQUENCY: INTERMITTENT
FREQUENCY: CONTINUOUS
FREQUENCY: INTERMITTENT
FREQUENCY: INTERMITTENT
FREQUENCY: CONTINUOUS

## 2022-04-12 ASSESSMENT — PAIN DESCRIPTION - PROGRESSION
CLINICAL_PROGRESSION: NOT CHANGED
CLINICAL_PROGRESSION: GRADUALLY IMPROVING
CLINICAL_PROGRESSION: NOT CHANGED

## 2022-04-12 NOTE — PLAN OF CARE
Problem: Falls - Risk of:  Goal: Will remain free from falls  Description: Will remain free from falls  Outcome: Ongoing     Problem: Falls - Risk of:  Goal: Absence of physical injury  Description: Absence of physical injury  Outcome: Ongoing     Problem: Pain:  Goal: Pain level will decrease  Description: Pain level will decrease  Outcome: Ongoing     Problem: Pain:  Goal: Control of acute pain  Description: Control of acute pain  Outcome: Ongoing     Problem: Pain:  Goal: Control of chronic pain  Description: Control of chronic pain  Outcome: Ongoing     Problem: Skin Integrity:  Goal: Will show no infection signs and symptoms  Description: Will show no infection signs and symptoms  Outcome: Ongoing     Problem: Skin Integrity:  Goal: Absence of new skin breakdown  Description: Absence of new skin breakdown  Outcome: Ongoing     Problem: Infection - Surgical Site:  Goal: Will show no infection signs and symptoms  Description: Will show no infection signs and symptoms  Outcome: Ongoing

## 2022-04-12 NOTE — CARE COORDINATION
Status post op from yesterday trigeminal -LEFT MICROVASCULAR DECOMPRESSION. POD#1 today. Patient nauseated this morning and did not receive morning meds. Patient is not going home today. Patient has no need at discharge for home. Patient will be moved out of ICU to [facility]. CM will continue to follow patient until discharge.   Electronically signed by Monster Ayala RN on 4/12/2022 at 3:29 PM

## 2022-04-12 NOTE — PROGRESS NOTES
Physical Therapy    Facility/Department: [facility] ICU  Initial Assessment/Treatment/Discharge    NAME: Codie Jade  : 1985  MRN: [id number]    Date of Service: 2022    Discharge Recommendations:    Codie Jade scored a 24/24 on the AM-PAC short mobility form. At this time, no further PT is recommended upon discharge. Recommend patient returns to prior setting with prior services. PT Equipment Recommendations  Equipment Needed: No    Assessment   Assessment: Pt functioning at baseline. Safe to go home with family. Recommend pt ambulate in hallway with nsg as able. Pt agreeable. No further inpt PT indicated. D/C PT. Decision Making: Low Complexity  PT Education: PT Role  REQUIRES PT FOLLOW UP: No  Activity Tolerance  Activity Tolerance: Patient Tolerated treatment well       Patient Diagnosis(es): There were no encounter diagnoses. has a past medical history of Anxiety, Mild depression ([address] and Trigeminal neuralgia. has a past surgical history that includes  section and craniotomy (Left, 2022). Restrictions  Position Activity Restriction  Other position/activity restrictions: Up in chair, up as tolerated  Vision/Hearing  Vision: Impaired  Vision Exceptions: Wears glasses at all times  Hearing: Within functional limits     Subjective  General  Chart Reviewed: Yes  Patient assessed for rehabilitation services?: Yes  Additional Pertinent Hx: PMH: trigeminal neuralgia. pt admitted for:  LEFT MICROVASCULAR DECOMPRESSION on . Family / Caregiver Present: No  Referring Practitioner: Jorge Tellez  Referral Date : 22  Diagnosis: trigeminal neuralgia  Follows Commands: Within Functional Limits  Subjective  Subjective: Pt supine in bed & agreeable to PT. C/O nausea.   Pain Screening  Patient Currently in Pain: Yes (2/10 headache)  Vital Signs  Patient Currently in Pain: Yes (2/10 headache)       Orientation  Orientation  Overall Orientation Status: Within Normal Limits  Social/Functional History  Social/Functional History  Lives With: Spouse (7 yo child)  Type of Home: Apartment  Home Layout: One level  Home Access: Stairs to enter without rails  Entrance Stairs - Number of Steps: 1 step on sidewalk  Bathroom Shower/Tub: Walk-in shower  Bathroom Toilet: Standard  Bathroom Equipment:  (None)  Home Equipment:  (None)  ADL Assistance: Independent  Homemaking Assistance: Independent  Ambulation Assistance: Independent  Transfer Assistance: Independent  Active : No  Leisure & Hobbies: Sew, dance  Additional Comments: No falls         Objective          AROM RLE (degrees)  RLE AROM: WNL  AROM LLE (degrees)  LLE AROM : WNL  Strength RLE  Strength RLE: WFL  Strength LLE  Strength LLE: WFL        Bed mobility  Sit to Supine: Modified independent (HOB elevated)  Transfers  Sit to Stand: Independent  Stand to sit:  Independent  Ambulation  Ambulation?: Yes  Ambulation 1  Surface: level tile  Device: No Device  Assistance: Independent  Quality of Gait: slow & guarded d/t nausea  Gait Deviations: None  Distance: 250 ft  Stairs/Curb  Stairs?: No (N/A)     Balance  Sitting - Static: Good  Sitting - Dynamic: Good  Standing - Static: Good  Standing - Dynamic: Good        Plan   Safety Devices  Type of devices: Call light within reach,Bed alarm in place,Left in bed,Nurse notified                                                         AM-PAC Score  AM-PAC Inpatient Mobility Raw Score : 24 (04/12/22 1532)  AM-PAC Inpatient T-Scale Score : 61.14 (04/12/22 1532)  Mobility Inpatient CMS 0-100% Score: 0 (04/12/22 1532)  Mobility Inpatient CMS G-Code Modifier : [facility] (04/12/22 1532)                Therapy Time   Individual Concurrent Group Co-treatment   Time In 1339         Time Out 1402         Minutes 23                 Eliza George, PT

## 2022-04-12 NOTE — PROGRESS NOTES
NEUROSURGERY POST-OP PROGRESS NOTE    Patient Name: Miguel Angel Hanks YOB: 1985   Sex: Female Age: 29 yrs     Medical Record Number: [id number] Bonnie Number: [de-identified]   Room Number: 2504/7552-03 Hospital Day: Hospital Day: 2     Interval History:  Post-operative Day# 1 s/p Procedure(s) (LRB):  LEFT MICROVASCULAR DECOMPRESSION (Left)    Subjective: Pt very nauseated and dizzy this am. Also c/o of severe neck and head pain    Objective:    VITAL SIGNS   /81   Pulse 84   Temp 97.9 °F (36.6 °C) (Oral)   Resp 17   Ht 5' 3\" (1.6 m)   Wt 134 lb 0.6 oz (60.8 kg)   SpO2 100%   BMI 23.74 kg/m²    Height Height: 5' 3\" (160 cm)   Weight Weight: 134 lb 0.6 oz (60.8 kg)        Allergies Allergies   Allergen Reactions    Phenylephrine-Dm-Gg-Apap Rash    Sulfa Antibiotics Rash      NPO Status ADULT DIET;  Clear Liquid   Isolation No active isolations     LABS   Basic Metabolic Profile Recent Labs     04/12/22  0450         CO2 22   BUN 7   CREATININE <0.5*   GLUCOSE 125*      Complete Blood Count Recent Labs     04/12/22 0450   WBC 11.1*   RBC 3.75*      Coagulation Studies Recent Labs     04/12/22 0450   INR 1.02        MEDICATIONS   Inpatient Medications   scopolamine, 1 patch, TransDERmal, Q72H    dexamethasone, 4 mg, IntraVENous, Q6H **FOLLOWED BY** [START ON 4/13/2022] dexamethasone, 4 mg, Oral, 3 times per day **FOLLOWED BY** [START ON 4/14/2022] dexamethasone, 4 mg, Oral, 2 times per day **FOLLOWED BY** [START ON 4/16/2022] dexamethasone, 4 mg, Oral, Daily    sertraline, 50 mg, Oral, Daily    sodium chloride flush, 5-40 mL, IntraVENous, 2 times per day    heparin (porcine), 5,000 Units, SubCUTAneous, 3 times per day    carBAMazepine, 600 mg, Oral, BID   Infusions    sodium chloride      sodium chloride 125 mL/hr at 04/12/22 0723      Antibiotics   Recent Abx Admin      No antibiotic orders with administrations found. Neurologic Exam:  Mental status: awake and alert and oriented x4    Cranial Nerves:  II: Visual acuity not tested, visual fields full, denies new visual changes / diplopia  III, IV, VI: PERRL, 3 mm bilaterally, EOMI, no nystagmus noted  V: Facial sensation intact bilaterally to touch  VII: Face symmetric  VIII: Hearing intact bilaterally to spoken voice  IX: Palate movement equal bilaterally  XI: Shoulder shrug equal bilaterally  XII: Tongue midline      Musculoskeletal:   Gait: Not tested   Tone: normal  Sensory: intact to all extremities  Motor strength:    Right  Left    Right  Left    Deltoid  5 5  Hip Flex  5 5   Biceps  5 5  Knee Extensors  5 5   Triceps  5 5  Knee Flexors  5 5   Wrist Ext  5 5  Ankle Dorsiflex. 5 5   Wrist Flex  5 5  Ankle Plantarflex. 5 5   Handgrip  5 5  Ext Guy Longus  5 5   Thumb Ext  5 5         Incision: intact, clean and dry      Respiratory:  Unlabored respiratory pattern    Abdomen:   Soft, ND       Cardiovascular:  Warm, well perfused    Assessment   Patient is a 28 yo F s/p Procedure(s) (LRB):  LEFT MICROVASCULAR DECOMPRESSION (Left) per Dr. Tania Powell . Pt educated from [facility]\" book pg  25-27, 33-38. Plan:  1. Neurologic exam frequency: q4  2. Mobility: PT/OT eval  3. Steroids:fast taper wean   4. Diet:advance as tolerated  5. DVT Prophylaxis: SCDs and heparin  6. GI Prophylaxis: protonix  7. Bowel Regimen: senna and glycolax  8. Pain control:kelsey and valium, robaxin ordered  9. Compazine ordered for nausea along with scopolamine patch  10. Incisional Care: open to air  11. Dispo Planning: transfer out of ICU    Patient was seen with Dr. Kwaku Colon who agrees with above assessment and plan. Electronically signed by:  ZHANNA Wang CNP, 4/12/2022 10:58 AM   Neurosurgery Nurse Practitioner  [phone]

## 2022-04-12 NOTE — PROGRESS NOTES
Unable to administer any of the pts morning PO medications due to nausea symptoms. Ordered zofran and reglan doses given without symptom improvement.

## 2022-04-12 NOTE — PLAN OF CARE
Problem: Falls - Risk of:  Goal: Will remain free from falls  Description: Will remain free from falls  Outcome: Ongoing     Problem: Pain:  Goal: Pain level will decrease  Description: Pain level will decrease  Outcome: Ongoing     Problem: Skin Integrity:  Goal: Will show no infection signs and symptoms  Description: Will show no infection signs and symptoms  Outcome: Ongoing     Problem: Infection - Surgical Site:  Goal: Will show no infection signs and symptoms  Description: Will show no infection signs and symptoms  Outcome: Ongoing

## 2022-04-12 NOTE — PROGRESS NOTES
Occupational Therapy   Occupational Therapy Initial Assessment /Treatment/Discharge  Date: 2022   Patient Name: Ene Patel  MRN: [id number]     : 1985    Date of Service: 2022    Discharge Recommendations:   .Ene Patel scored a 24/24 on the [facility] ADL Inpatient form. At this time, no further OT is recommended upon discharge. Recommend patient returns to prior setting with prior services. OT Equipment Recommendations  Equipment Needed: No    Assessment   Assessment: Pt demonstrates good functional independence. No acute OT needs identified. Pt expresses no concerns regarding discharge to home. Prognosis: Good  Decision Making: Low Complexity  OT Education: OT Role  Patient Education: Pt verbalized understanding  REQUIRES OT FOLLOW UP: No  Activity Tolerance  Activity Tolerance: Patient Tolerated treatment well  Safety Devices  Safety Devices in place: Yes  Type of devices: Left in chair;Call light within reach;Nurse notified; Chair alarm in place           Restrictions  Position Activity Restriction  Other position/activity restrictions: Up in chair, up as tolerated    Subjective   General  Chart Reviewed: Yes  Patient assessed for rehabilitation services?: Yes  Additional Pertinent Hx: Pt admitted 22 with left sided facial pain.  left microvascular decompression. PMH includes: anxietry, mild depression, Trigeminal neuralgia  Family / Caregiver Present: No  Referring Practitioner: GEORGIA Greer  Diagnosis: Trigeminal neuralgia  Subjective  Subjective: Pt in bed upon entry. Pt requested to use the bathroom.   Patient Currently in Pain: Yes (2/10 headache)  Social/Functional History  Social/Functional History  Lives With: Spouse (7 yo child)  Type of Home: Apartment  Home Layout: One level  Home Access: Stairs to enter without rails  Entrance Stairs - Number of Steps: 1 step on sidewalk  Bathroom Shower/Tub: Walk-in shower  Bathroom Toilet: Standard  Bathroom Equipment:  (None)  Home Equipment:  (None)  ADL Assistance: Independent  Homemaking Assistance: Independent  Ambulation Assistance: Independent  Transfer Assistance: Independent  Active : No  Leisure & Hobbies: Sew, dance  Additional Comments: No falls       Objective   Vision: Impaired  Vision Exceptions: Wears glasses at all times  Hearing: Within functional limits    Orientation  Overall Orientation Status: Within Normal Limits     Balance  Sitting Balance: Independent  Standing Balance: Supervision  Standing Balance  Time: ~2 min, ~3 min  Activity: bathroom mobility/activity, walk in patel  Functional Mobility  Functional - Mobility Device: No device  Activity: To/from bathroom; Other  Assist Level: Supervision  Toilet Transfers  Toilet - Technique: Ambulating  Equipment Used: Standard toilet  Toilet Transfer: Supervision  ADL  Grooming: Independent  LE Dressing: Independent  Toileting: Independent  Tone RUE  RUE Tone: Normotonic  Tone LUE  LUE Tone: Normotonic  Coordination  Movements Are Fluid And Coordinated: Yes     Bed mobility  Sit to Supine: Modified independent (HOB elevated)  Transfers  Stand Step Transfers: Supervision  Sit to stand: Supervision  Stand to sit: Supervision     Cognition  Overall Cognitive Status: WNL                 LUE AROM (degrees)  LUE AROM : WFL  RUE AROM (degrees)  RUE AROM : WFL        Hand Dominance  Hand Dominance: Right     Treatment included ADL and transfer training.         Plan   Plan  Plan Comment: Discharge pt from acute OT    AM-PAC Score        AM-PAC Inpatient Daily Activity Raw Score: 24 (04/12/22 1528)  AM-PAC Inpatient ADL T-Scale Score : 57.54 (04/12/22 1528)  ADL Inpatient CMS 0-100% Score: 0 (04/12/22 1528)  ADL Inpatient CMS G-Code Modifier : [address] (04/12/22 1528)    Goals    No goals indicated       Therapy Time   Individual Concurrent Group Co-treatment   Time In 1336         Time Out 1359         Minutes 23         Timed Code Treatment Minutes: 13 Minutes Total Treatment [address] [facility], [facility]

## 2022-04-12 NOTE — PROGRESS NOTES
ICU Progress Note    Admit Date: 4/11/2022  Day: 2  Vent Day: None  IV Access:Peripheral  IV Fluids:None  Vasopressors:None                Antibiotics: None  Diet: ADULT DIET; Clear Liquid    CC: facial pain     Interval history:   Patient seen bedside. Overnight the patient felt nauseous and was given zofran. Labs appreciated, showed patient remained anemic with mild leukocyte count elevation. Patient still feels nauseous but she is well other wise. No shooting pain on her face. She denies any fevers, chills, diarrhea or body aches. I/O- +889 ( UO 3100)  Pt had Reglan added for her nausea. HPI:    Pt is a 30 yo F with PMHx of MDD, anxiety who presents with left sided facial pain. Patient comes into the hospital for left microvascular decompression. Patient denies any fevers, chills, nausea or vomiting. Pt endorsed having episodes of hypotension from time to time.      Patient was seen post op. Pt has no drains placed. Patient is awake with mild pain behind her left ear from the surgical incision.  The staples look clean with no erythema.        Medications:     Scheduled Meds:   sertraline  50 mg Oral Daily    sodium chloride flush  5-40 mL IntraVENous 2 times per day    dexamethasone  4 mg Oral Q6H    heparin (porcine)  5,000 Units SubCUTAneous 3 times per day    carBAMazepine  600 mg Oral BID     Continuous Infusions:   sodium chloride      sodium chloride 125 mL/hr at 04/12/22 0600     PRN Meds:sodium chloride flush, sodium chloride, oxyCODONE, HYDROmorphone **OR** HYDROmorphone, diazePAM, ondansetron **OR** ondansetron    Objective:   Vitals:   T-max:  Patient Vitals for the past 8 hrs:   BP Temp Temp src Pulse Resp SpO2 Weight   04/12/22 0600 117/81 -- -- 84 15 100 % --   04/12/22 0500 115/77 -- -- 86 16 100 % --   04/12/22 0400 112/75 97.9 °F (36.6 °C) Oral 88 14 99 % --   04/12/22 0313 -- -- -- -- -- -- 134 lb 0.6 oz (60.8 kg)   04/12/22 0300 125/80 -- -- -- -- -- --   04/12/22 0200 120/82 -- -- 91 16 100 % --   04/12/22 0100 116/76 -- -- 91 16 99 % --   04/12/22 0000 114/79 98.2 °F (36.8 °C) Oral 90 15 97 % --   04/11/22 2300 118/77 -- -- 90 18 100 % --       Intake/Output Summary (Last 24 hours) at 4/12/2022 0658  Last data filed at 4/12/2022 0600  Gross per 24 hour   Intake 4189.26 ml   Output 3300 ml   Net 889.26 ml       Review of Systems   Constitutional: Negative for chills and fever. HENT: Positive for ear pain. Respiratory: Negative. Cardiovascular: Negative. Gastrointestinal: Positive for nausea. Genitourinary: Negative. Musculoskeletal: Negative. All other systems reviewed and are negative. Physical Exam  Constitutional:       Appearance: Normal appearance. HENT:      Head: Normocephalic. Comments: Left post aucular incision      Nose: Nose normal.      Mouth/Throat:      Pharynx: Oropharynx is clear. Eyes:      Extraocular Movements: Extraocular movements intact. Conjunctiva/sclera: Conjunctivae normal.      Pupils: Pupils are equal, round, and reactive to light. Cardiovascular:      Rate and Rhythm: Normal rate and regular rhythm. Pulses: Normal pulses. Heart sounds: Normal heart sounds. Pulmonary:      Effort: Pulmonary effort is normal.      Breath sounds: Normal breath sounds. Abdominal:      General: Abdomen is flat. Bowel sounds are normal.      Palpations: Abdomen is soft. Musculoskeletal:         General: Normal range of motion. Skin:     General: Skin is warm. Capillary Refill: Capillary refill takes less than 2 seconds. Neurological:      General: No focal deficit present. Mental Status: She is alert and oriented to person, place, and time. Mental status is at baseline.            LABS:    CBC:   Recent Labs     04/12/22  0450   WBC 11.1*   HGB 10.1*   HCT 31.0*      MCV 82.5     Renal:  No results for input(s): NA, K, CL, CO2, BUN, CREATININE, GLUCOSE, CALCIUM, MG, PHOS, ANIONGAP in the last 72 decompression   Nausea is better controlled with reglan

## 2022-04-13 VITALS
BODY MASS INDEX: 23.75 KG/M2 | TEMPERATURE: 98.8 F | RESPIRATION RATE: 18 BRPM | HEART RATE: 87 BPM | HEIGHT: 63 IN | DIASTOLIC BLOOD PRESSURE: 71 MMHG | WEIGHT: 134.04 LBS | SYSTOLIC BLOOD PRESSURE: 115 MMHG | OXYGEN SATURATION: 98 %

## 2022-04-13 PROBLEM — Z98.890 S/P CRANIOTOMY: Status: ACTIVE | Noted: 2022-04-13

## 2022-04-13 LAB
BASOPHILS ABSOLUTE: 0 K/UL (ref 0–0.2)
BASOPHILS RELATIVE PERCENT: 0.1 %
EOSINOPHILS ABSOLUTE: 0 K/UL (ref 0–0.6)
EOSINOPHILS RELATIVE PERCENT: 0 %
HCT VFR BLD CALC: 31.7 % (ref 36–48)
HEMOGLOBIN: 10.6 G/DL (ref 12–16)
LYMPHOCYTES ABSOLUTE: 0.9 K/UL (ref 1–5.1)
LYMPHOCYTES RELATIVE PERCENT: 11.7 %
MCH RBC QN AUTO: 27.1 PG (ref 26–34)
MCHC RBC AUTO-ENTMCNC: 33.2 G/DL (ref 31–36)
MCV RBC AUTO: 81.4 FL (ref 80–100)
MONOCYTES ABSOLUTE: 0.3 K/UL (ref 0–1.3)
MONOCYTES RELATIVE PERCENT: 4.6 %
NEUTROPHILS ABSOLUTE: 6.1 K/UL (ref 1.7–7.7)
NEUTROPHILS RELATIVE PERCENT: 83.6 %
PDW BLD-RTO: 13.7 % (ref 12.4–15.4)
PLATELET # BLD: 260 K/UL (ref 135–450)
PMV BLD AUTO: 8.4 FL (ref 5–10.5)
RBC # BLD: 3.9 M/UL (ref 4–5.2)
WBC # BLD: 7.3 K/UL (ref 4–11)

## 2022-04-13 PROCEDURE — 6370000000 HC RX 637 (ALT 250 FOR IP): Performed by: PHYSICIAN ASSISTANT

## 2022-04-13 PROCEDURE — 6370000000 HC RX 637 (ALT 250 FOR IP): Performed by: NURSE PRACTITIONER

## 2022-04-13 PROCEDURE — 2580000003 HC RX 258: Performed by: NURSE PRACTITIONER

## 2022-04-13 PROCEDURE — 6370000000 HC RX 637 (ALT 250 FOR IP)

## 2022-04-13 PROCEDURE — 36415 COLL VENOUS BLD VENIPUNCTURE: CPT

## 2022-04-13 PROCEDURE — 6360000002 HC RX W HCPCS: Performed by: PHYSICIAN ASSISTANT

## 2022-04-13 PROCEDURE — 6360000002 HC RX W HCPCS: Performed by: NURSE PRACTITIONER

## 2022-04-13 PROCEDURE — 85025 COMPLETE CBC W/AUTO DIFF WBC: CPT

## 2022-04-13 PROCEDURE — 2580000003 HC RX 258: Performed by: PHYSICIAN ASSISTANT

## 2022-04-13 PROCEDURE — C9113 INJ PANTOPRAZOLE SODIUM, VIA: HCPCS | Performed by: NURSE PRACTITIONER

## 2022-04-13 RX ORDER — OXYCODONE HYDROCHLORIDE 5 MG/1
5 TABLET ORAL EVERY 6 HOURS PRN
Qty: 28 TABLET | Refills: 0 | Status: SHIPPED | OUTPATIENT
Start: 2022-04-13 | End: 2022-04-20

## 2022-04-13 RX ORDER — SCOLOPAMINE TRANSDERMAL SYSTEM 1 MG/1
1 PATCH, EXTENDED RELEASE TRANSDERMAL
Qty: 1 PATCH | Refills: 0 | Status: SHIPPED | OUTPATIENT
Start: 2022-04-15 | End: 2022-04-16

## 2022-04-13 RX ORDER — METHOCARBAMOL 750 MG/1
750 TABLET, FILM COATED ORAL 3 TIMES DAILY
Qty: 30 TABLET | Refills: 0 | Status: SHIPPED | OUTPATIENT
Start: 2022-04-13 | End: 2022-04-23

## 2022-04-13 RX ORDER — DEXAMETHASONE 4 MG/1
TABLET ORAL
Qty: 6 TABLET | Refills: 0 | Status: SHIPPED | OUTPATIENT
Start: 2022-04-13 | End: 2022-04-16

## 2022-04-13 RX ORDER — PANTOPRAZOLE SODIUM 40 MG/1
40 TABLET, DELAYED RELEASE ORAL
Qty: 15 TABLET | Refills: 0 | Status: SHIPPED | OUTPATIENT
Start: 2022-04-13 | End: 2022-04-28

## 2022-04-13 RX ADMIN — PANTOPRAZOLE SODIUM 40 MG: 40 INJECTION, POWDER, FOR SOLUTION INTRAVENOUS at 08:35

## 2022-04-13 RX ADMIN — ACETAMINOPHEN 1000 MG: 500 TABLET ORAL at 08:35

## 2022-04-13 RX ADMIN — SENNOSIDES AND DOCUSATE SODIUM 2 TABLET: 50; 8.6 TABLET ORAL at 08:35

## 2022-04-13 RX ADMIN — SERTRALINE HYDROCHLORIDE 50 MG: 50 TABLET ORAL at 08:35

## 2022-04-13 RX ADMIN — DEXAMETHASONE 4 MG: 4 TABLET ORAL at 10:48

## 2022-04-13 RX ADMIN — CARBAMAZEPINE 600 MG: 200 TABLET, EXTENDED RELEASE ORAL at 08:35

## 2022-04-13 RX ADMIN — METHOCARBAMOL 750 MG: 750 TABLET ORAL at 10:48

## 2022-04-13 RX ADMIN — HEPARIN SODIUM 5000 UNITS: 5000 INJECTION INTRAVENOUS; SUBCUTANEOUS at 06:51

## 2022-04-13 RX ADMIN — DEXAMETHASONE SODIUM PHOSPHATE 4 MG: 4 INJECTION, SOLUTION INTRAMUSCULAR; INTRAVENOUS at 03:55

## 2022-04-13 RX ADMIN — SODIUM CHLORIDE, PRESERVATIVE FREE 20 ML: 5 INJECTION INTRAVENOUS at 08:35

## 2022-04-13 RX ADMIN — METHOCARBAMOL 1000 MG: 100 INJECTION, SOLUTION INTRAMUSCULAR; INTRAVENOUS at 03:54

## 2022-04-13 ASSESSMENT — PAIN DESCRIPTION - LOCATION
LOCATION: HEAD

## 2022-04-13 ASSESSMENT — PAIN SCALES - GENERAL
PAINLEVEL_OUTOF10: 0
PAINLEVEL_OUTOF10: 1
PAINLEVEL_OUTOF10: 0
PAINLEVEL_OUTOF10: 3
PAINLEVEL_OUTOF10: 3

## 2022-04-13 ASSESSMENT — PAIN DESCRIPTION - PAIN TYPE
TYPE: ACUTE PAIN

## 2022-04-13 ASSESSMENT — ENCOUNTER SYMPTOMS
NAUSEA: 1
RESPIRATORY NEGATIVE: 1

## 2022-04-13 NOTE — PLAN OF CARE
Problem: Falls - Risk of:  Goal: Will remain free from falls  Description: Will remain free from falls  Outcome: Completed  Goal: Absence of physical injury  Description: Absence of physical injury  Outcome: Completed     Problem: Pain:  Goal: Pain level will decrease  Description: Pain level will decrease  Outcome: Completed  Goal: Control of acute pain  Description: Control of acute pain  Outcome: Completed  Goal: Control of chronic pain  Description: Control of chronic pain  Outcome: Completed     Problem: Skin Integrity:  Goal: Will show no infection signs and symptoms  Description: Will show no infection signs and symptoms  Outcome: Completed  Goal: Absence of new skin breakdown  Description: Absence of new skin breakdown  Outcome: Completed     Problem: Infection - Surgical Site:  Goal: Will show no infection signs and symptoms  Description: Will show no infection signs and symptoms  Outcome: Completed

## 2022-04-13 NOTE — PROGRESS NOTES
Pt tolerating diet. Denies and nausea/vomiting. Pt states good pain control with oral prn pain medication.

## 2022-04-13 NOTE — PROGRESS NOTES
ICU Progress Note    Admit Date: 4/11/2022  Day: 3  Vent Day: None  IV Access:Peripheral  IV Fluids:None  Vasopressors:None                Antibiotics: None  Diet: ADULT DIET; Clear Liquid    CC: facial pain     Interval history:   Patient seen at bedside. Nausea and pain well controlled with oral medications. Patients symptoms improving. Pt will be going home today and following up with Neurosurgery. All questions were answered. Pt denies any vomiting, diarrhea, fevers or chills. I/O- +1400    HPI:    Pt is a 28 yo F with PMHx of MDD, anxiety who presents with left sided facial pain. Patient comes into the hospital for left microvascular decompression. Patient denies any fevers, chills, nausea or vomiting. Pt endorsed having episodes of hypotension from time to time.      Patient was seen post op. Pt has no drains placed. Patient is awake with mild pain behind her left ear from the surgical incision.  The staples look clean with no erythema.        Medications:     Scheduled Meds:   scopolamine  1 patch TransDERmal Q72H    dexamethasone  4 mg Oral 3 times per day    Followed by   Dianna Carrillo ON 4/14/2022] dexamethasone  4 mg Oral 2 times per day    Followed by   Dianna Carrillo ON 4/16/2022] dexamethasone  4 mg Oral Daily    pantoprazole  40 mg IntraVENous Daily    methocarbamol  750 mg Oral 4 times per day    sennosides-docusate sodium  2 tablet Oral Daily    polyethylene glycol  17 g Oral Daily    sertraline  50 mg Oral Daily    sodium chloride flush  5-40 mL IntraVENous 2 times per day    heparin (porcine)  5,000 Units SubCUTAneous 3 times per day    carBAMazepine  600 mg Oral BID     Continuous Infusions:   sodium chloride       PRN Meds:acetaminophen, prochlorperazine, sodium chloride flush, sodium chloride, oxyCODONE, HYDROmorphone **OR** HYDROmorphone, diazePAM    Objective:   Vitals:   T-max:  Patient Vitals for the past 8 hrs:   BP Temp Temp src Pulse Resp SpO2   04/13/22 0600 -- -- -- 81 20 -- 04/13/22 0500 -- -- -- 97 19 --   04/13/22 0400 115/72 97.6 °F (36.4 °C) Oral 83 16 100 %   04/13/22 0300 -- -- -- 86 16 --   04/13/22 0200 -- -- -- 81 19 --   04/13/22 0100 -- -- -- 79 16 --   04/13/22 0000 116/72 98 °F (36.7 °C) Oral 77 15 99 %       Intake/Output Summary (Last 24 hours) at 4/13/2022 0734  Last data filed at 4/12/2022 1820  Gross per 24 hour   Intake 1474 ml   Output --   Net 1474 ml       Review of Systems   Constitutional: Negative for chills and fever. HENT: Positive for ear pain. Respiratory: Negative. Cardiovascular: Negative. Gastrointestinal: Positive for nausea. Genitourinary: Negative. Musculoskeletal: Negative. All other systems reviewed and are negative. Physical Exam  Constitutional:       Appearance: Normal appearance. HENT:      Head: Normocephalic. Comments: Left post aucular incision      Nose: Nose normal.      Mouth/Throat:      Pharynx: Oropharynx is clear. Eyes:      Extraocular Movements: Extraocular movements intact. Conjunctiva/sclera: Conjunctivae normal.      Pupils: Pupils are equal, round, and reactive to light. Cardiovascular:      Rate and Rhythm: Normal rate and regular rhythm. Pulses: Normal pulses. Heart sounds: Normal heart sounds. Pulmonary:      Effort: Pulmonary effort is normal.      Breath sounds: Normal breath sounds. Abdominal:      General: Abdomen is flat. Bowel sounds are normal.      Palpations: Abdomen is soft. Musculoskeletal:         General: Normal range of motion. Skin:     General: Skin is warm. Capillary Refill: Capillary refill takes less than 2 seconds. Neurological:      General: No focal deficit present. Mental Status: She is alert and oriented to person, place, and time. Mental status is at baseline.            LABS:    CBC:   Recent Labs     04/12/22  0450 04/13/22  0507   WBC 11.1* 7.3   HGB 10.1* 10.6*   HCT 31.0* 31.7*    260   MCV 82.5 81.4     Renal:    Recent Labs     04/12/22  0450      K 4.2      CO2 22   BUN 7   CREATININE <0.5*   GLUCOSE 125*   CALCIUM 8.5   ANIONGAP 11     Hepatic: No results for input(s): AST, ALT, BILITOT, BILIDIR, PROT, LABALBU, ALKPHOS in the last 72 hours. Troponin: No results for input(s): TROPONINI in the last 72 hours. BNP: No results for input(s): BNP in the last 72 hours. Lipids: No results for input(s): CHOL, HDL in the last 72 hours. Invalid input(s): LDLCALCU, TRIGLYCERIDE  ABGs:  No results for input(s): PHART, JCJ1EMT, PO2ART, OAT8ZRA, BEART, THGBART, M3GDBJHA, APG6YNX in the last 72 hours. INR:   Recent Labs     04/12/22  0450   INR 1.02     Lactate: No results for input(s): LACTATE in the last 72 hours. Cultures:  -----------------------------------------------------------------  RAD:   CT Head wo Contrast   Final Result   1. Postsurgical changes related to left microvascular decompression. Small amount of pneumocephalus. No acute hemorrhage. Assessment/Plan:     Rowan Leal is a 28 yo F who comes into [facility] for s/p left microvascular decompression for trigeminal neuralgia.      Trigeminal Neuralgia s/p left microvascular decompression   Pt endorsed left faial pain.      -Continue Tegretlor  mg BID  -Decadron 4mg Q6   -Dilaudid for pain PRN  -Diazepam 5mg Q6 PRN for muscle spasm   - Zofran for nausea PRN - 3x given yesterday -> Metoclopramide added this morning   -Tylenol for headache    -scopolamine added PRN  -Reglan PRN   -Protonix for prophylaxis     MDD  -Continue sertaline 50mg WD     Normocytic Anemia   Hg in the 10s on admission. Reticulocyte count sent.      Will discuss with attending physician       Code Status:Full code  FEN: Adult Diet   PPX: SCDs, Protonix   DISPO: Yadira Hernandez MD, PGY-1  04/13/22  7:34 AM

## 2022-04-13 NOTE — PROGRESS NOTES
Went over discharge instructions with patient and spouse. Education on incision care and new medications given. Patient denied any questions or concerns. Medications from outpatient pharmacy given to .  to drive pt home.

## 2022-04-13 NOTE — PROGRESS NOTES
NEUROSURGERY POST-OP PROGRESS NOTE    Patient Name: Lola Adams YOB: 1985   Sex: Female Age: 29 yrs     Medical Record Number: [id number] Acct Number: [de-identified]   Room Number: 0795/3763-70 Hospital Day: Hospital Day: 3     Interval History:  Post-operative Day# 2 s/p Procedure(s) (LRB):  LEFT MICROVASCULAR DECOMPRESSION (Left)    Subjective: Pt nausea and dizziness better. Objective:    VITAL SIGNS   /72   Pulse 81   Temp 97.6 °F (36.4 °C) (Oral)   Resp 20   Ht 5' 3\" (1.6 m)   Wt 134 lb 0.6 oz (60.8 kg)   SpO2 100%   BMI 23.74 kg/m²    Height Height: 5' 3\" (160 cm)   Weight Weight: 134 lb 0.6 oz (60.8 kg)        Allergies Allergies   Allergen Reactions    Phenylephrine-Dm-Gg-Apap Rash    Sulfa Antibiotics Rash      NPO Status ADULT DIET;  Clear Liquid   Isolation No active isolations     LABS   Basic Metabolic Profile Recent Labs     04/12/22  0450         CO2 22   BUN 7   CREATININE <0.5*   GLUCOSE 125*      Complete Blood Count Recent Labs     04/12/22  0450 04/13/22  0507   WBC 11.1* 7.3   RBC 3.75* 3.90*      Coagulation Studies Recent Labs     04/12/22  0450   INR 1.02        MEDICATIONS   Inpatient Medications   scopolamine, 1 patch, TransDERmal, Q72H    [COMPLETED] dexamethasone, 4 mg, IntraVENous, Q6H **FOLLOWED BY** dexamethasone, 4 mg, Oral, 3 times per day **FOLLOWED BY** [START ON 4/14/2022] dexamethasone, 4 mg, Oral, 2 times per day **FOLLOWED BY** [START ON 4/16/2022] dexamethasone, 4 mg, Oral, Daily    pantoprazole, 40 mg, IntraVENous, Daily    [COMPLETED] methocarbamol IVPB, 1,000 mg, IntraVENous, Q8H **FOLLOWED BY** methocarbamol, 750 mg, Oral, 4 times per day    sennosides-docusate sodium, 2 tablet, Oral, Daily    polyethylene glycol, 17 g, Oral, Daily    sertraline, 50 mg, Oral, Daily    sodium chloride flush, 5-40 mL, IntraVENous, 2 times per day    heparin (porcine), 5,000 Units, SubCUTAneous, 3 times per day    carBAMazepine, 600 mg, Oral, BID   Infusions    sodium chloride        Antibiotics   Recent Abx Admin      No antibiotic orders with administrations found. Neurologic Exam:  Mental status: awake and alert and oriented x4    Cranial Nerves:  II: Visual acuity not tested, visual fields full, denies new visual changes / diplopia  III, IV, VI: PERRL, 3 mm bilaterally, EOMI, no nystagmus noted  V: Facial sensation intact bilaterally to touch  VII: Face symmetric  VIII: Hearing intact bilaterally to spoken voice  IX: Palate movement equal bilaterally  XI: Shoulder shrug equal bilaterally  XII: Tongue midline      Musculoskeletal:   Gait: Not tested   Tone: normal  Sensory: intact to all extremities  Motor strength:    Right  Left    Right  Left    Deltoid  5 5  Hip Flex  5 5   Biceps  5 5  Knee Extensors  5 5   Triceps  5 5  Knee Flexors  5 5   Wrist Ext  5 5  Ankle Dorsiflex. 5 5   Wrist Flex  5 5  Ankle Plantarflex. 5 5   Handgrip  5 5  Ext Guy Longus  5 5   Thumb Ext  5 5         Incision: intact, clean and dry    Respiratory:  Unlabored respiratory pattern    Abdomen:   Soft, ND       Cardiovascular:  Warm, well perfused    Assessment   Patient is a 30 yo F s/p Procedure(s) (LRB):  LEFT MICROVASCULAR DECOMPRESSION (Left) per Dr. Kwaku Colon . Plan:  Patient improved significantly post-operatively. Eating, voiding and ambulating well. Pain and nausea well controlled on oral medications  Surgical incision CDI without issues  Discharge home with family  All discharge instructions including dressing changes and follow up care gone over with patient and family and provided in written form    Patient was seen with Dr. Kwaku Colon who agrees with above assessment and plan. Electronically signed by:  ZHANNA Wang CNP, 4/13/2022 7:10 AM   Neurosurgery Nurse Practitioner  [phone]

## 2022-04-13 NOTE — DISCHARGE SUMMARY
Discharge Summary    Date of Admission: 4/11/2022  7:00 AM  Date of Discharge: 4/13/22  Admission Diagnosis: Trigeminal neuralgia [G50.0]  Discharge Diagnosis: Same   Condition on Discharge: good  Attending for Admission: Mia Montaño. Wendy Sanford MD  Procedures: Procedure(s) (LRB):  LEFT MICROVASCULAR DECOMPRESSION (Left)  Consults: IP CONSULT TO CRITICAL CARE    Reason for Admission:  Alice Buchanan is a 29 y.o. female patient who was admitted to the hospital for complaints of Left facial pain. She underwent the procedure listed above on 4/11/22. Hospital Course:  After surgery, Her pre-operative L facial pain was Present. She complained of incisional pain. The pain was well-controlled on oral medications. The incision was clean, dry and intact. There was no erythema or edema around the surgical site. Prior to discharge She was eating well, urinating and ambulating with a steady gait. Discharge Vitals/Labs:  /72   Pulse 81   Temp 97.6 °F (36.4 °C) (Oral)   Resp 20   Ht 5' 3\" (1.6 m)   Wt 134 lb 0.6 oz (60.8 kg)   SpO2 100%   BMI 23.74 kg/m²   CBC:   Lab Results   Component Value Date    WBC 7.3 04/13/2022    RBC 3.90 04/13/2022    HGB 10.6 04/13/2022    HCT 31.7 04/13/2022    MCV 81.4 04/13/2022    MCH 27.1 04/13/2022    MCHC 33.2 04/13/2022    RDW 13.7 04/13/2022     04/13/2022    MPV 8.4 04/13/2022     CMP:    Lab Results   Component Value Date     04/12/2022    K 4.2 04/12/2022     04/12/2022    CO2 22 04/12/2022    BUN 7 04/12/2022    CREATININE <0.5 04/12/2022    GFRAA >60 04/12/2022    GFRAA >60 08/26/2012    LABGLOM >60 04/12/2022    GLUCOSE 125 04/12/2022    PROT 6.7 08/26/2012    LABALBU 3.8 08/26/2012    CALCIUM 8.5 04/12/2022    BILITOT 0.51 08/26/2012    ALKPHOS 116 08/26/2012    AST 21 08/26/2012    ALT 28 08/26/2012       Discharge Medications:   The patient suffers from a major neurological surgery that pain cannot be managed within an average of 30 MED per day. Severe acute postoperative pain is the reason for exceeding the 30 MED average, and the prescription reflects the same dosage patient received while inpatient, which is the lowest dose consistent with the patients medical condition. Non-opioid treatment options have been considered prior to prescribing opioids, and the patient has been advised of the benefits and risks of the opioid (including the potential for addiction). Medication List      START taking these medications    dexamethasone 4 MG tablet  Commonly known as: DECADRON  Take 1 tablet by mouth every 8 hours for 1 day, THEN 1 tablet every 12 hours for 1 day, THEN 1 tablet daily for 1 day. Start taking on: April 13, 2022     methocarbamol 750 MG tablet  Commonly known as: ROBAXIN  Take 1 tablet by mouth 3 times daily for 10 days     oxyCODONE 5 MG immediate release tablet  Commonly known as: ROXICODONE  Take 1 tablet by mouth every 6 hours as needed for Pain for up to 7 days. pantoprazole 40 MG tablet  Commonly known as: PROTONIX  Take 1 tablet by mouth every morning (before breakfast) for 15 days     scopolamine transdermal patch  Commonly known as: TRANSDERM-SCOP  Place 1 patch onto the skin every 72 hours for 1 dose  Start taking on: April 15, 2022        CHANGE how you take these medications    carBAMazepine 100 MG extended release tablet  Commonly known as: TEGRETOL XR  What changed: Another medication with the same name was removed. Continue taking this medication, and follow the directions you see here.         CONTINUE taking these medications    sertraline 50 MG tablet  Commonly known as: ZOLOFT        STOP taking these medications    co-enzyme Q-10 30 MG capsule     Excedrin Migraine 250-250-65 MG per tablet  Generic drug: aspirin-acetaminophen-caffeine     Magnesium 400 MG Tabs     Multi-Vitamins Tabs     vitamin B-2 100 MG Tabs tablet  Commonly known as: RIBOFLAVIN           Where to Get Your Medications      You can get these medications from any pharmacy    Bring a paper prescription for each of these medications  · dexamethasone 4 MG tablet  · methocarbamol 750 MG tablet  · oxyCODONE 5 MG immediate release tablet  · pantoprazole 40 MG tablet  · scopolamine transdermal patch         Discharge Destination:  The patient was discharged to Home. Follow-up:  The patient is to follow-up with Jessica Brannon. Clayton Corcoran MD in the office in 2 weeks      Discharge Instructions:   Verbal and written discharge instructions were given to the patient at the time of discharge. No NSAIDS or anticoagulation for 1 week post-operatively. No driving or riding in a motor vehicle. No lifting or bending    Electronically signed by:  Suzi White, ZHANNA - CNP, ZHANNA-CNP, 4/13/2022 7:15 AM  [phone]

## 2022-04-13 NOTE — DISCHARGE INSTR - COC
Continuity of Care Form    Patient Name: Batsheva Milton   :  1985  MRN:  [id number]    Admit date:  2022  Discharge date:  ***    Code Status Order: Full Code   Advance Directives:   Advance Care Flowsheet Documentation       Date/Time Healthcare Directive Type of Healthcare Directive Copy in [address] Agent's Name Healthcare Agent's Phone Number    22 0725 No, patient does not have an advance directive for healthcare treatment -- -- -- -- --            Admitting Physician:  Graciela Yanez. Bertrand Mendez MD  PCP: Sherlyn Lorenz MD    Discharging Nurse: [facility] Unit/Room#: 5469/0045-25  Discharging Unit Phone Number: ***    Emergency Contact:   Extended Emergency Contact Information  Primary Emergency Contact: Juanito Amezcua  Address: [address] [place] [address], [address] [place] [address],[address] Phone: [phone]  Mobile Phone: [phone]  Relation: Spouse  Secondary Emergency Contact: Tamia Leon  Address: [address] [place] [address] Phone: [phone]  Relation: Parent    Past Surgical History:  Past Surgical History:   Procedure Laterality Date     SECTION      CRANIOTOMY Left 2022    LEFT MICROVASCULAR DECOMPRESSION performed by Graciela Yanez.  Bertrand Mendez MD at [facility] OR       Immunization History:   Immunization History   Administered Date(s) Administered    COVID-19, [employer] top, DILUTE for use, 12+ yrs, 30mcg/0.3mL dose 2021, 2021, 2021       Active Problems:  Patient Active Problem List   Diagnosis Code    Rupture of fetal membranes [id number]    Trigeminal neuralgia G50.0    S/P craniotomy Z98.890       Isolation/Infection:   Isolation            No Isolation          Patient Infection Status       None to display            Nurse Assessment:  Last Vital Signs: /72   Pulse 81   Temp 97.6 °F (36.4 °C) (Oral)   Resp 20   Ht 5' 3\" (1.6 m)   Wt 134 lb 0.6 oz (60.8 kg)   SpO2 100%   BMI 23.74 kg/m²     Last documented pain score (0-10 scale): Pain Level: 0  Last Weight:   Wt Readings from Last 1 Encounters:   22 134 lb 0.6 oz (60.8 kg)     Mental Status:  {IP PT MENTAL STATUS:}    IV Access:  { SAYDA IV ACCESS:151072312}    Nursing Mobility/ADLs:  Walking   {CHP DME CPWZ:985468487}  Transfer  {CHP DME ANCU:409085098}  Bathing  {CHP DME XUAI:152756513}  Dressing  {CHP DME WIEN:738162267}  Toileting  {CHP DME RTNB:053276216}  Feeding  {CHP DME EJYJ:881740494}  Med Admin  {CHP DME YPJV:863220009}  Med Delivery   { SAYDA MED Delivery:293173210}    Wound Care Documentation and Therapy:        Elimination:  Continence: Bowel: {YES / LL:40209}  Bladder: {YES / ON:13371}  Urinary Catheter: {Urinary Catheter:594229168}   Colostomy/Ileostomy/Ileal Conduit: {YES / XT:95648}       Date of Last BM: ***    Intake/Output Summary (Last 24 hours) at 2022 0736  Last data filed at 2022 1820  Gross per 24 hour   Intake 1474 ml   Output --   Net 1474 ml     I/O last 3 completed shifts:   In: 3358.3 [I.V.:3358.3]  Out: -     Safety Concerns:     [address] Safety Concerns:361559094}    Impairments/Disabilities:      [address] Impairments/Disabilities:525688506}    Nutrition Therapy:  Current Nutrition Therapy:   [address] [employer] Diet List:880944197}    Routes of Feeding: {CHP DME Other Feedings:094762424}  Liquids: {Slp liquid thickness:83644}  Daily Fluid Restriction: {CHP DME Yes amt example:264882213}  Last Modified Barium Swallow with Video (Video Swallowing Test): {Done Not Done WWMB:389176401}    Treatments at the Time of Hospital Discharge:   Respiratory Treatments: ***  Oxygen Therapy:  {Therapy; copd oxygen:22274}  Ventilator:    { SOFY Vent TTPB:866368669}    Rehab Therapies: {THERAPEUTIC INTERVENTION:0786341879}  Weight Bearing Status/Restrictions: [address] Weight Bearin}  Other Medical Equipment (for information only, NOT a DME order):  {EQUIPMENT:281850361}  Other Treatments: ***    Patient's personal belongings (please select all that are sent with patient):  {CHP DME Belongings:593825025}    RN SIGNATURE:  {Esignature:547627318}    CASE MANAGEMENT/SOCIAL WORK SECTION    Inpatient Status Date: ***    Readmission Risk Assessment Score:  Readmission Risk              Risk of Unplanned Readmission:  13           Discharging to Facility/ Agency   Name:   Address:  Phone:  Fax:    Dialysis Facility (if applicable)   Name:  Address:  Dialysis Schedule:  Phone:  Fax:    / signature: {Esignature:253829402}    PHYSICIAN SECTION    Prognosis: {Prognosis:0592731306}    Condition at Discharge: [address] [place] [address] Patient Condition:212877942}    Rehab Potential (if transferring to Rehab): {Prognosis:4755248520}    Recommended Labs or Other Treatments After Discharge: ***    Physician Certification: I certify the above information and transfer of Karolyn Bai  is necessary for the continuing treatment of the diagnosis listed and that she requires {Admit to Appropriate Level of Care:17687} for {GREATER/LESS:629034049} 30 days.      Update Admission H&P: {CHP DME Changes in TRQFR:795773158}    PHYSICIAN SIGNATURE:  {Esignature:713973959}

## 2022-04-14 LAB
IRON SATURATION: 29 % (ref 15–50)
IRON: 91 UG/DL (ref 37–145)
TOTAL IRON BINDING CAPACITY: 311 UG/DL (ref 260–445)

## 2022-04-27 NOTE — OP NOTE
Operative Report    PATIENT NAME: Manuel Connolly  YOB: 1985  MEDICAL RECORD# [id number]  SURGERY DATE: 4/11/2022  SURGEON:  Markus Sanchez MD, PhD  ASSISTANT:  Faiza Paz PA-C., served as assistant on this surgery due to the complex nature   of the surgery and the lack of a resident or fellow assistant. She provided assistance with critical tissue retraction at parts of the surgery, wound closure and assistance with protecting critical neuro elements. DICTATED BY: Markus Sanchez MD, PhD      PREOPERATIVE DIAGNOSIS:  1. Left sided trigeminal neuralgia    POSTOPERATIVE DIAGNOSIS:  1. Left sided trigeminal neuralgia    PROCEDURES PERFORMED:  1. Left retrosigmoid craniectomy    2. Microvascular decompression of the left trigeminal nerve away from the SCA  3.  Placement of Telfa sponge  4. Microdissection  5. Collection of autologous bone linda for reconstruction  6. Medpor cranioplasty less than 5 cm    ANESTHESIA:  General    INDICATION FOR SURGERY: The patient is a 29 y.o. female who presented with complaints of un-remmiting, paroxysmal left side facial pain. They are currently experiencing severe pain within the left V2/3 distribution, occuring several times each day. MRI fiesta scan demonstrated vascular compression of the left trigeminal nerve by the SCA. The patient elected to proceed with microvascular decompression of the nerve. We discussed the risks and benefits to include (but not limited to): bleeding, infection, inability to relieve her pain, CSF leak, need for further surgery, vascular injury, loss of hearing (left), facial weakness, stroke, coma and death. He expressed his understanding of these risks and elected for operative intervention. OPERATIVE FINDINGS: The artery was found draped across the trigeminal root entry zone and was adherent to the nerve via arachnoid attachments.  The artery was  from the nerve and a Telfa sponge was placed to maintain that separation. The operative time was 2.5 hours. PROCEDURE IN DETAIL: The patient was brought to the operating room, general anesthesia was induced and the patient was intubated. The patient was placed in the lateral decubitus position, left side facing upward, with a small gel roll under the right axilla. The head was fixed in a Cordon crown-of-thorns, vertex was lower slightly toward the floor and the head left in a neutral position. A curvilinear incision was planned over the left asterion using anatomic landmarks to localize the transverse sinus and digastric groove. A time out was completed, the site was prepped and draped in the usual sterile fashion. Intravenous antibiotics were given by anesthesia. The arterial pCO2 was maintained at 28, 10 mg IVP decadron was administered. The planned incision was infiltrated with 1% lidocaine and incised full thickness with a #10 blade. Bovie electrocautery was utilized to open the periosteal layer. The flap was elevated using a periosteal elevator and Bovie electrocautery. The asterion was localized and the craniectomy performed just inferior and lateral to expose the transverse-sigmoid junction and posterior fossa dura. A #11 blade was used to open the dura along the inferior edge of the transverse sinus, extending along the posterior edge of the sigmoid sinus. Once the dura was reflected, a 4-0 neurolon stitch was placed to tent the dura at the junction. A #3 Agustina Quevedo was used to gentle retract the cerebellum and a #5 Rhoton suction was used to release CSF for approximately 5 minutes. This resulted in significant brain relaxation. A posterior fossa retractor was placed for gentle retraction of the cerebellar hemisphere. At this point, the operating microscope was brought into the field. A microscissor and #3 Rhoton suction were used to release the arachnoid attachments and the trigeminal nerve and superior petrosal vein were immediately visible.  The arachnoid attachment of the vein was dissected and cut, this allowed for visualization of the trigeminal root entry zone without having the sacrifice the vein. The artery was found attached to the superior aspect of the trigeminal nerve from it's entry into Meckel's cave back to the root entry zone. This was attached to the nerve via several arachnoid attachments. These were released with a microdissector, pick and microscissors, allowing the artery to fall away from the nerve. A Telfon wedge as then placed above the nerve to maintain separation from the superior cerebellar artery. The retractor was then removed from the field and the posterior fossa irrigated. The dura was closed in a water tight fashion using 4-0 Neurolon suture, valsalva to 40 was performed without CSF egress. The wound was copiously irrigated with antibiotic irrigation. Tissel was sprayed over the dura, covered with Duraform and a large titanium ricky hole cover was affixed with Synthes screws. The galea was closed with interrupted 2-0 Vicryl sutures and the skin was re-approximated with 4-0 monocryl. A dermabond dressing was then applied. The patient was awakened from anesthesia and extubated. Her neurologic exam was stable post-operatively and she was transferred to the PACU. All needle, instrument, and sponge counts were correct. I attest that I was present throughout the entire operation in accordance with CMS guidelines. ESTIMATED BLOOD LOSS: 50 cubic centimeters. FLUIDS: Per Anesthesia. SPECIMENS: No specimens. COMPLICATIONS: None apparent. DRAINS PLACED: None    DISPOSITION: The patient was brought to the intensive care unit awake, following commands, and moving all 4 extremities.

## 2025-04-02 ENCOUNTER — HOSPITAL ENCOUNTER (EMERGENCY)
Age: 40
Discharge: HOME | End: 2025-04-02
Payer: COMMERCIAL

## 2025-04-02 ENCOUNTER — OFFICE VISIT (OUTPATIENT)
Age: 40
End: 2025-04-02

## 2025-04-02 VITALS
DIASTOLIC BLOOD PRESSURE: 106 MMHG | TEMPERATURE: 97.9 F | BODY MASS INDEX: 23.45 KG/M2 | HEART RATE: 90 BPM | RESPIRATION RATE: 22 BRPM | WEIGHT: 132.4 LBS | SYSTOLIC BLOOD PRESSURE: 145 MMHG | OXYGEN SATURATION: 100 %

## 2025-04-02 VITALS
SYSTOLIC BLOOD PRESSURE: 158 MMHG | DIASTOLIC BLOOD PRESSURE: 103 MMHG | TEMPERATURE: 97.9 F | OXYGEN SATURATION: 97 % | HEART RATE: 93 BPM

## 2025-04-02 DIAGNOSIS — K04.7 DENTAL INFECTION: Primary | ICD-10-CM

## 2025-04-02 DIAGNOSIS — R51.9 PRESSURE AND PAIN OF RIGHT SIDE OF FACE: Primary | ICD-10-CM

## 2025-04-02 PROBLEM — G44.209 TENSION TYPE HEADACHE: Status: ACTIVE | Noted: 2017-03-07

## 2025-04-02 PROBLEM — F39 MOOD DISORDER: Status: ACTIVE | Noted: 2024-08-16

## 2025-04-02 PROBLEM — F32.A ANXIETY AND DEPRESSION: Status: ACTIVE | Noted: 2020-03-30

## 2025-04-02 PROBLEM — F41.9 ANXIETY AND DEPRESSION: Status: ACTIVE | Noted: 2020-03-30

## 2025-04-02 PROBLEM — G50.0 TRIGEMINAL NEURALGIA: Status: ACTIVE | Noted: 2021-11-19

## 2025-04-02 PROBLEM — F90.9 ADHD (ATTENTION DEFICIT HYPERACTIVITY DISORDER): Status: ACTIVE | Noted: 2023-01-06

## 2025-04-02 PROCEDURE — 99283 EMERGENCY DEPT VISIT LOW MDM: CPT

## 2025-04-02 PROCEDURE — 6370000000 HC RX 637 (ALT 250 FOR IP): Performed by: PHYSICIAN ASSISTANT

## 2025-04-02 RX ORDER — MAGNESIUM OXIDE 400 MG/1
400 TABLET ORAL DAILY
COMMUNITY

## 2025-04-02 RX ORDER — DIPHENOXYLATE HYDROCHLORIDE AND ATROPINE SULFATE 2.5; .025 MG/1; MG/1
TABLET ORAL
COMMUNITY

## 2025-04-02 RX ORDER — OXYCODONE AND ACETAMINOPHEN 5; 325 MG/1; MG/1
1 TABLET ORAL ONCE
Refills: 0 | Status: COMPLETED | OUTPATIENT
Start: 2025-04-02 | End: 2025-04-02

## 2025-04-02 RX ORDER — UBIDECARENONE 50 MG
50 CAPSULE ORAL DAILY
COMMUNITY

## 2025-04-02 RX ORDER — DEXTROAMPHETAMINE SACCHARATE, AMPHETAMINE ASPARTATE MONOHYDRATE, DEXTROAMPHETAMINE SULFATE AND AMPHETAMINE SULFATE 3.75; 3.75; 3.75; 3.75 MG/1; MG/1; MG/1; MG/1
15 CAPSULE, EXTENDED RELEASE ORAL EVERY MORNING
COMMUNITY
Start: 2025-03-03

## 2025-04-02 RX ORDER — CHLORHEXIDINE GLUCONATE ORAL RINSE 1.2 MG/ML
15 SOLUTION DENTAL 3 TIMES DAILY
Qty: 118 ML | Refills: 0 | Status: SHIPPED | OUTPATIENT
Start: 2025-04-02 | End: 2025-04-16

## 2025-04-02 RX ADMIN — OXYCODONE HYDROCHLORIDE AND ACETAMINOPHEN 1 TABLET: 5; 325 TABLET ORAL at 10:39

## 2025-04-02 RX ADMIN — AMOXICILLIN AND CLAVULANATE POTASSIUM 1 TABLET: 875; 125 TABLET, FILM COATED ORAL at 10:39

## 2025-04-02 ASSESSMENT — ENCOUNTER SYMPTOMS
WHEEZING: 0
RHINORRHEA: 0
ABDOMINAL PAIN: 0
NAUSEA: 1
DIARRHEA: 0
COUGH: 0
VOMITING: 1
SHORTNESS OF BREATH: 0
SORE THROAT: 0

## 2025-04-02 ASSESSMENT — PAIN - FUNCTIONAL ASSESSMENT: PAIN_FUNCTIONAL_ASSESSMENT: 0-10

## 2025-04-02 ASSESSMENT — PAIN SCALES - GENERAL: PAINLEVEL_OUTOF10: 8

## 2025-04-02 NOTE — DISCHARGE INSTRUCTIONS
Toothache    Toothaches are generally caused by tooth decay.  If you have severe pain or swelling around a tooth, you may have a deep tooth infection.  Tooth decay and infections require evaluation and treatment by a dentist or an oral surgeon.    The emergency department will provide you with the best possible care available for your dental problem.  Unfortunately, there is not a dentist or dental clinic available in the department.  Routine dental care, such as fillings, tooth extractions, or jayne canals are not available in the emergency department.  However, we are avaialbe for emergencies including abscesses, fractures of the jaw and other oral trauma such as a tooth that is knocked out.  Any other dental problem is best treated by a dentist.  Please see the list of dental clinics in the area if you do not currently have a dentist.      Treatment That Can Be Provided for Toothache in the Emergency Department    If you have a severe toothache, medication may be prescribed for you until you are able to see a dentist.  If you are given an antibiotic, take it as prescribed and continue to take it until gone.  Even if you start to feel better, your toothache will need to be treated by a dentist or an oral surgeon.    Pain medication may be prescribed for you.  As is the policy of the [facility], the emergency department uses nonsteroidal anti-inflammatory medication (NSAIDs) as the primary medication for pain.  These may include ibuprofen (Motrin®) or naproxyn sodium (Naprosyn®).    Patients who are unable to take nonsteroidal anti-inflammatory medications will generally be advised to take acetaminophen (Tylenol®) for dental pain.    As is the policy of the [facility] dental clinics, the [facility] emergency department policy strongly discourages the use of the narcotic medications. (Tylenol #3®, Vicodin®, etc) are restricted by specific, strict guidelines.    If you have any

## 2025-04-02 NOTE — PROGRESS NOTES
Graham Perez (: 1985) is a 39 y.o. female, here for evaluation of the following chief complaint(s): Sinusitis (Feels swollen and painful, pain started about a week ago but has gotten worse over the last 2 days, not much of an appetite, did vomit last night and nauseas  pain id on right side )    Graham Perez is a: Established patient.   Last Urgent Care Visit: Visit date not found   I have reviewed the patient's medications and basic medical history; see Medication Reconciliation.    ASSESSMENT/PLAN:    ICD-10-CM    1. Pressure and pain of right side of face  R51.9           There are several broken teeth along the right side and some mild tenderness to palpation along the back right lower molar which is broken at the gum line and has dental caries, suspect this is likely the source of pain, but patient reports 8/10 severe pain along her entire right side of her face and I am unable to pinpoint a source for this severe pain on exam other than teeth. She does have some mild cervical adenopathy on the R.   The patient was advised to go to the emergency department. They require a higher level of care due to degree of pain without found source. The patient is agreeable to this plan. They were offered EMS transport and declined. They were allowed to self transport as they are awake and alert, with normal vitals, non-intoxicated, non-suicidal, and nonpsychotic. There is no medical condition that prevents or inhibits their understanding of the risks/benefits of their decision. They are capable of medical decision making. The patient verbalized understanding of the risks and benefits of their decision including , morbidity, and ultimately mortality. They have been given the opportunity to ask questions about their medical condition. MA was a witness to the above conversation.  Pt's BP was noted to be elevated during initial vital signs assessment - repeated BP assessment, >5 minutes after the initial

## 2025-04-02 NOTE — ED PROVIDER NOTES
Procedures      CRITICAL CARE TIME (.cctime)   None    PAST MEDICAL HISTORY      has a past medical history of Anxiety, Mild depression, and Trigeminal neuralgia.     EMERGENCY DEPARTMENT COURSE and DIFFERENTIAL DIAGNOSIS/MDM:   Vitals:    Vitals:    04/02/25 1016   BP: (!) 145/106   Pulse: 90   Resp: 22   Temp: 97.9 °F (36.6 °C)   TempSrc: Oral   SpO2: 100%   Weight: 60.1 kg (132 lb 6.4 oz)       Is this patient to be included in the SEP-1 Core Measure due to severe sepsis or septic shock?   No   Exclusion criteria - the patient is NOT to be included for SEP-1 Core Measure due to:  2+ SIRS criteria are not met    Patient was given the following medications:  Medications   oxyCODONE-acetaminophen (PERCOCET) 5-325 MG per tablet 1 tablet (1 tablet Oral Given 4/2/25 1039)   amoxicillin-clavulanate (AUGMENTIN) 875-125 MG per tablet 1 tablet (1 tablet Oral Given 4/2/25 1039)             Chronic Conditions affecting care: Trigeminal neuralgia   has a past medical history of Anxiety, Mild depression, and Trigeminal neuralgia.    CONSULTS: (Who and What was discussed)  None          Records Reviewed (External, Source and Summary) none    CC/HPI Summary, DDx, ED Course, and Reassessment: Patient was evaluated in the emergency department today for jaw pain and right sided face pain.  I do think that her pain is stemming from a broken tooth on the right lower side of her jaw.  She has significant tenderness to palpation in this area.  She is given a dose of Percocet in the emergency department today for pain control.  She is started on Augmentin.  She is advised to alternate ibuprofen and Tylenol for pain.  Given referral for dentist.  She is otherwise given return precautions.  All questions were answered and she is discharged home in good condition.    Disposition Considerations (tests considered but not done, Admit vs D/C, Shared Decision Making, Pt Expectation of Test or Tx.): The patient centered provided urgent care

## (undated) DEVICE — COVER LT HNDL CAM BLU DISP W/ SURG CTRL

## (undated) DEVICE — BLADE CLIPPER SURG SENSICLIP

## (undated) DEVICE — COVER XR CASS W20XL41IN UNIV ADH STRP

## (undated) DEVICE — TOOL F2/8TA23 LEGEND 8CM 2.3MM TAPER: Brand: MIDAS REX™

## (undated) DEVICE — SUTURE VCRL SZ 2-0 L27IN ABSRB UD L26MM SH 1/2 CIR J417H

## (undated) DEVICE — PIN ADLT MAYFIELD RIGID MOLD FINGER

## (undated) DEVICE — CRANI: Brand: MEDLINE INDUSTRIES, INC.

## (undated) DEVICE — TOOL 10BA50-MN LEGEND 10CM 5MM BA: Brand: MIDAS REX™

## (undated) DEVICE — PRESSURE MONITORING SET: Brand: TRUWAVE, VAMP PLUS

## (undated) DEVICE — GLOVE SURG SZ 65 CRM LTX FREE POLYISOPRENE POLYMER BEAD ANTI

## (undated) DEVICE — SUTURE ETHLN SZ 3-0 L18IN NONABSORBABLE BLK PS-2 L19MM 3/8 1669H

## (undated) DEVICE — SUTURE NRLN SZ 4-0 L18IN NONABSORBABLE BLK L13MM TF 1/2 CIR C584D

## (undated) DEVICE — SEALANT SURG 13 YR DURA AUTOSPRAY ADHERUS NUS106] SURGICAL ONE]

## (undated) DEVICE — SOLUTION IV 1000ML 0.9% SOD CHL

## (undated) DEVICE — GLOVE SURG SZ 65 L12IN FNGR THK79MIL GRN LTX FREE

## (undated) DEVICE — ADAPTER LAB INTMED LUER 17GA

## (undated) DEVICE — UNDERGLOVE SURG SZ 8 BLU LTX FREE SYN POLYISOPRENE POLYMER

## (undated) DEVICE — HOOK RETRCT 12MM S STL BLNT E STAY LONE STAR

## (undated) DEVICE — FELT SURG W1XL1IN THK1.65MM SQ PTFE STRL

## (undated) DEVICE — TOWEL,STOP FLAG GOLD N-W: Brand: MEDLINE

## (undated) DEVICE — SUTURE VCRL SZ 3-0 L18IN ABSRB UD L26MM SH 1/2 CIR J864D

## (undated) DEVICE — NEURO SPONGES: Brand: DEROYAL

## (undated) DEVICE — SUTURE MCRYL SZ 4-0 L27IN ABSRB UD L19MM PS-2 1/2 CIR PRIM Y426H

## (undated) DEVICE — GLOVE SURG SZ 6 L12IN FNGR THK75MIL WHT LTX POLYMER BEAD

## (undated) DEVICE — CODMAN® INTEGRATED BIPOLAR CORD AND TUBING SET FLYING LEADS, ROTARY PUMP: Brand: CODMAN®

## (undated) DEVICE — SYRINGE MED 10ML TRNSLUC BRL PLUNG BLK MRK POLYPR CTRL

## (undated) DEVICE — GLOVE SURG SZ 75 L12IN FNGR THK94MIL TRNSLUC YEL LTX